# Patient Record
Sex: FEMALE | Race: BLACK OR AFRICAN AMERICAN | NOT HISPANIC OR LATINO | Employment: OTHER | URBAN - METROPOLITAN AREA
[De-identification: names, ages, dates, MRNs, and addresses within clinical notes are randomized per-mention and may not be internally consistent; named-entity substitution may affect disease eponyms.]

---

## 2023-05-23 LAB
ALANINE AMINOTRANSFERASE (SGPT) (U/L) IN SER/PLAS: 22 U/L (ref 7–45)
ALBUMIN (G/DL) IN SER/PLAS: 3.6 G/DL (ref 3.4–5)
ALKALINE PHOSPHATASE (U/L) IN SER/PLAS: 115 U/L (ref 33–136)
ANION GAP IN SER/PLAS: 13 MMOL/L (ref 10–20)
ASPARTATE AMINOTRANSFERASE (SGOT) (U/L) IN SER/PLAS: 17 U/L (ref 9–39)
BILIRUBIN TOTAL (MG/DL) IN SER/PLAS: 0.8 MG/DL (ref 0–1.2)
CALCIUM (MG/DL) IN SER/PLAS: 9.5 MG/DL (ref 8.6–10.6)
CARBON DIOXIDE, TOTAL (MMOL/L) IN SER/PLAS: 24 MMOL/L (ref 21–32)
CHLORIDE (MMOL/L) IN SER/PLAS: 109 MMOL/L (ref 98–107)
CHOLESTEROL (MG/DL) IN SER/PLAS: 142 MG/DL (ref 0–199)
CHOLESTEROL IN HDL (MG/DL) IN SER/PLAS: 45.6 MG/DL
CHOLESTEROL/HDL RATIO: 3.1
CREATININE (MG/DL) IN SER/PLAS: 1.17 MG/DL (ref 0.5–1.05)
ERYTHROCYTE DISTRIBUTION WIDTH (RATIO) BY AUTOMATED COUNT: 15.3 % (ref 11.5–14.5)
ERYTHROCYTE MEAN CORPUSCULAR HEMOGLOBIN CONCENTRATION (G/DL) BY AUTOMATED: 33 G/DL (ref 32–36)
ERYTHROCYTE MEAN CORPUSCULAR VOLUME (FL) BY AUTOMATED COUNT: 77 FL (ref 80–100)
ERYTHROCYTES (10*6/UL) IN BLOOD BY AUTOMATED COUNT: 4.6 X10E12/L (ref 4–5.2)
ESTIMATED AVERAGE GLUCOSE FOR HBA1C: 111 MG/DL
GFR FEMALE: 46 ML/MIN/1.73M2
GLUCOSE (MG/DL) IN SER/PLAS: 95 MG/DL (ref 74–99)
HEMATOCRIT (%) IN BLOOD BY AUTOMATED COUNT: 35.5 % (ref 36–46)
HEMOGLOBIN (G/DL) IN BLOOD: 11.7 G/DL (ref 12–16)
HEMOGLOBIN A1C/HEMOGLOBIN TOTAL IN BLOOD: 5.5 %
LDL: 86 MG/DL (ref 0–99)
LEUKOCYTES (10*3/UL) IN BLOOD BY AUTOMATED COUNT: 5.9 X10E9/L (ref 4.4–11.3)
NRBC (PER 100 WBCS) BY AUTOMATED COUNT: 0 /100 WBC (ref 0–0)
PLATELETS (10*3/UL) IN BLOOD AUTOMATED COUNT: 174 X10E9/L (ref 150–450)
POTASSIUM (MMOL/L) IN SER/PLAS: 4.2 MMOL/L (ref 3.5–5.3)
PROTEIN TOTAL: 6.7 G/DL (ref 6.4–8.2)
SODIUM (MMOL/L) IN SER/PLAS: 142 MMOL/L (ref 136–145)
TRIGLYCERIDE (MG/DL) IN SER/PLAS: 53 MG/DL (ref 0–149)
UREA NITROGEN (MG/DL) IN SER/PLAS: 22 MG/DL (ref 6–23)
VLDL: 11 MG/DL (ref 0–40)

## 2023-11-10 PROBLEM — H40.1221: Status: ACTIVE | Noted: 2023-11-10

## 2023-11-10 PROBLEM — B02.9 SHINGLES: Status: ACTIVE | Noted: 2023-11-10

## 2023-11-10 PROBLEM — R09.89 CAROTID BRUIT: Status: ACTIVE | Noted: 2023-11-10

## 2023-11-10 PROBLEM — I25.10 CAD (CORONARY ARTERY DISEASE): Status: ACTIVE | Noted: 2023-11-10

## 2023-11-10 PROBLEM — R63.4 UNEXPLAINED WEIGHT LOSS: Status: ACTIVE | Noted: 2023-11-10

## 2023-11-10 PROBLEM — E78.5 HYPERLIPIDEMIA: Status: ACTIVE | Noted: 2023-11-10

## 2023-11-10 PROBLEM — D64.9 ANEMIA: Status: ACTIVE | Noted: 2023-11-10

## 2023-11-10 PROBLEM — M54.12 CERVICAL RADICULOPATHY: Status: ACTIVE | Noted: 2023-11-10

## 2023-11-10 PROBLEM — E66.9 OBESITY: Status: ACTIVE | Noted: 2023-11-10

## 2023-11-10 PROBLEM — Z98.61 POST PTCA: Status: ACTIVE | Noted: 2023-11-10

## 2023-11-10 PROBLEM — H04.123 BILATERAL DRY EYES: Status: ACTIVE | Noted: 2023-11-10

## 2023-11-10 PROBLEM — H25.10 NUCLEAR SCLEROSIS: Status: ACTIVE | Noted: 2023-11-10

## 2023-11-10 PROBLEM — H52.7 REFRACTIVE ERROR: Status: ACTIVE | Noted: 2023-11-10

## 2023-11-10 PROBLEM — H40.1212: Status: ACTIVE | Noted: 2023-11-10

## 2023-11-10 PROBLEM — R01.1 HEART MURMUR: Status: ACTIVE | Noted: 2023-11-10

## 2023-11-10 PROBLEM — I35.0 AORTIC STENOSIS: Status: ACTIVE | Noted: 2023-11-10

## 2023-11-10 PROBLEM — B02.30 HERPES ZOSTER OPHTHALMICUS OF LEFT EYE: Status: ACTIVE | Noted: 2023-11-10

## 2023-11-10 PROBLEM — I10 HYPERTENSION: Status: ACTIVE | Noted: 2023-11-10

## 2023-11-10 PROBLEM — I21.4 NON-ST ELEVATION MYOCARDIAL INFARCTION (NSTEMI), SUBSEQUENT EPISODE OF CARE (MULTI): Status: ACTIVE | Noted: 2023-11-10

## 2023-11-10 PROBLEM — H52.4 BILATERAL PRESBYOPIA: Status: ACTIVE | Noted: 2023-11-10

## 2023-11-10 PROBLEM — R79.9 ABNORMAL BLOOD CHEMISTRY: Status: ACTIVE | Noted: 2023-11-10

## 2023-11-10 PROBLEM — G56.01 RIGHT CARPAL TUNNEL SYNDROME: Status: ACTIVE | Noted: 2023-11-10

## 2023-11-10 PROBLEM — J30.9 ALLERGIC RHINITIS: Status: ACTIVE | Noted: 2023-11-10

## 2023-11-10 PROBLEM — M79.643 PAIN IN HAND: Status: ACTIVE | Noted: 2023-11-10

## 2023-11-10 PROBLEM — M25.532 LEFT WRIST PAIN: Status: ACTIVE | Noted: 2023-11-10

## 2023-11-10 PROBLEM — R20.2 RIGHT HAND PARESTHESIA: Status: ACTIVE | Noted: 2023-11-10

## 2023-11-10 PROBLEM — G56.00 CARPAL TUNNEL SYNDROME: Status: ACTIVE | Noted: 2023-11-10

## 2023-11-10 PROBLEM — M11.232 PSEUDOGOUT OF LEFT WRIST: Status: ACTIVE | Noted: 2023-11-10

## 2023-11-10 PROBLEM — H52.03 HYPEROPIA OF BOTH EYES: Status: ACTIVE | Noted: 2023-11-10

## 2023-11-10 RX ORDER — EZETIMIBE 10 MG/1
1 TABLET ORAL NIGHTLY
COMMUNITY
Start: 2022-04-25 | End: 2024-02-26 | Stop reason: SDUPTHER

## 2023-11-10 RX ORDER — ATORVASTATIN CALCIUM 80 MG/1
1 TABLET, FILM COATED ORAL NIGHTLY
COMMUNITY
Start: 2021-11-23 | End: 2024-04-29

## 2023-11-10 RX ORDER — BRIMONIDINE TARTRATE 1.5 MG/ML
1 SOLUTION/ DROPS OPHTHALMIC 2 TIMES DAILY
COMMUNITY
Start: 2022-01-04

## 2023-11-10 RX ORDER — METOPROLOL TARTRATE 25 MG/1
0.5 TABLET, FILM COATED ORAL 2 TIMES DAILY
COMMUNITY
Start: 2021-11-23 | End: 2023-11-20 | Stop reason: SDUPTHER

## 2023-11-10 RX ORDER — NAPROXEN SODIUM 220 MG/1
1 TABLET, FILM COATED ORAL DAILY
COMMUNITY
Start: 2021-11-23

## 2023-11-10 RX ORDER — HYDROCHLOROTHIAZIDE 25 MG/1
1 TABLET ORAL DAILY
COMMUNITY
Start: 2014-05-27 | End: 2024-02-26 | Stop reason: SDUPTHER

## 2023-11-10 RX ORDER — CLOPIDOGREL BISULFATE 75 MG/1
1 TABLET ORAL DAILY
COMMUNITY
Start: 2013-12-10 | End: 2024-01-15 | Stop reason: SDUPTHER

## 2023-11-10 RX ORDER — ISOSORBIDE MONONITRATE 30 MG/1
1 TABLET, EXTENDED RELEASE ORAL DAILY
COMMUNITY
Start: 2021-11-23 | End: 2024-02-28 | Stop reason: ALTCHOICE

## 2023-11-20 DIAGNOSIS — I25.10 CORONARY ARTERY DISEASE INVOLVING NATIVE HEART, UNSPECIFIED VESSEL OR LESION TYPE, UNSPECIFIED WHETHER ANGINA PRESENT: Primary | ICD-10-CM

## 2023-11-20 RX ORDER — METOPROLOL TARTRATE 25 MG/1
12.5 TABLET, FILM COATED ORAL 2 TIMES DAILY
Qty: 90 TABLET | Refills: 1 | Status: SHIPPED | OUTPATIENT
Start: 2023-11-20 | End: 2024-05-27 | Stop reason: SDUPTHER

## 2023-11-27 ENCOUNTER — OFFICE VISIT (OUTPATIENT)
Dept: PRIMARY CARE | Facility: CLINIC | Age: 83
End: 2023-11-27
Payer: MEDICARE

## 2023-11-27 VITALS
HEART RATE: 60 BPM | RESPIRATION RATE: 16 BRPM | TEMPERATURE: 97.6 F | DIASTOLIC BLOOD PRESSURE: 74 MMHG | BODY MASS INDEX: 26.19 KG/M2 | OXYGEN SATURATION: 98 % | WEIGHT: 143.2 LBS | SYSTOLIC BLOOD PRESSURE: 126 MMHG

## 2023-11-27 DIAGNOSIS — E78.5 HYPERLIPIDEMIA, UNSPECIFIED HYPERLIPIDEMIA TYPE: ICD-10-CM

## 2023-11-27 DIAGNOSIS — I25.10 CORONARY ARTERY DISEASE, UNSPECIFIED VESSEL OR LESION TYPE, UNSPECIFIED WHETHER ANGINA PRESENT, UNSPECIFIED WHETHER NATIVE OR TRANSPLANTED HEART: Primary | ICD-10-CM

## 2023-11-27 DIAGNOSIS — I10 PRIMARY HYPERTENSION: ICD-10-CM

## 2023-11-27 DIAGNOSIS — Z12.31 ENCOUNTER FOR SCREENING MAMMOGRAM FOR MALIGNANT NEOPLASM OF BREAST: ICD-10-CM

## 2023-11-27 PROCEDURE — 99213 OFFICE O/P EST LOW 20 MIN: CPT | Mod: PO | Performed by: INTERNAL MEDICINE

## 2023-11-27 PROCEDURE — 1036F TOBACCO NON-USER: CPT | Performed by: INTERNAL MEDICINE

## 2023-11-27 PROCEDURE — 3074F SYST BP LT 130 MM HG: CPT | Performed by: INTERNAL MEDICINE

## 2023-11-27 PROCEDURE — 3078F DIAST BP <80 MM HG: CPT | Performed by: INTERNAL MEDICINE

## 2023-11-27 PROCEDURE — 1126F AMNT PAIN NOTED NONE PRSNT: CPT | Performed by: INTERNAL MEDICINE

## 2023-11-27 PROCEDURE — 99213 OFFICE O/P EST LOW 20 MIN: CPT | Performed by: INTERNAL MEDICINE

## 2023-11-27 PROCEDURE — 1159F MED LIST DOCD IN RCRD: CPT | Performed by: INTERNAL MEDICINE

## 2023-11-27 SDOH — ECONOMIC STABILITY: FOOD INSECURITY: WITHIN THE PAST 12 MONTHS, YOU WORRIED THAT YOUR FOOD WOULD RUN OUT BEFORE YOU GOT MONEY TO BUY MORE.: NEVER TRUE

## 2023-11-27 SDOH — ECONOMIC STABILITY: FOOD INSECURITY: WITHIN THE PAST 12 MONTHS, THE FOOD YOU BOUGHT JUST DIDN'T LAST AND YOU DIDN'T HAVE MONEY TO GET MORE.: NEVER TRUE

## 2023-11-27 ASSESSMENT — ENCOUNTER SYMPTOMS
CHILLS: 0
FEVER: 0
SHORTNESS OF BREATH: 0
ABDOMINAL PAIN: 0
NAUSEA: 0
VOMITING: 0

## 2023-11-27 ASSESSMENT — PAIN SCALES - GENERAL: PAINLEVEL: 0-NO PAIN

## 2023-11-27 NOTE — PROGRESS NOTES
Subjective   Patient ID: Zabrnia Syed is a 83 y.o. female who presents for No chief complaint on file..    Patient presents for follow up. No acute issues. Weight down 8 pounds since May. Under stress with her son who is in a SNF.          Review of Systems   Constitutional:  Negative for chills and fever.   Respiratory:  Negative for shortness of breath.    Cardiovascular:  Negative for chest pain.   Gastrointestinal:  Negative for abdominal pain, nausea and vomiting.       Objective   /74   Pulse 60   Temp 36.4 °C (97.6 °F)   Resp 16   Wt 65 kg (143 lb 3.2 oz)   SpO2 98%   BMI 26.19 kg/m²     Physical Exam  Gen appearance: well groomed in NAD  A & O: alert and oriented x 3  CV: RRR   Lungs: CTA bilaterally  Extr: no edema   Assessment/Plan   CAD: follow up with Cards  High chol: cont meds  3.   High blood pressure: cont meds  4.    Ordered mamm  5.    Return to office May for CPE.

## 2023-12-27 ENCOUNTER — APPOINTMENT (OUTPATIENT)
Dept: OPHTHALMOLOGY | Facility: CLINIC | Age: 83
End: 2023-12-27
Payer: MEDICARE

## 2024-01-15 DIAGNOSIS — I25.10 CORONARY ARTERY DISEASE, UNSPECIFIED VESSEL OR LESION TYPE, UNSPECIFIED WHETHER ANGINA PRESENT, UNSPECIFIED WHETHER NATIVE OR TRANSPLANTED HEART: Primary | ICD-10-CM

## 2024-01-15 RX ORDER — CLOPIDOGREL BISULFATE 75 MG/1
75 TABLET ORAL DAILY
Qty: 90 TABLET | Refills: 3 | Status: SHIPPED | OUTPATIENT
Start: 2024-01-15

## 2024-01-17 ENCOUNTER — ANCILLARY PROCEDURE (OUTPATIENT)
Dept: RADIOLOGY | Facility: CLINIC | Age: 84
End: 2024-01-17
Payer: MEDICARE

## 2024-01-17 ENCOUNTER — APPOINTMENT (OUTPATIENT)
Dept: RADIOLOGY | Facility: CLINIC | Age: 84
End: 2024-01-17
Payer: MEDICARE

## 2024-01-17 VITALS — HEIGHT: 62 IN | WEIGHT: 143 LBS | BODY MASS INDEX: 26.31 KG/M2

## 2024-01-17 DIAGNOSIS — Z12.31 ENCOUNTER FOR SCREENING MAMMOGRAM FOR MALIGNANT NEOPLASM OF BREAST: ICD-10-CM

## 2024-01-17 PROCEDURE — 77063 BREAST TOMOSYNTHESIS BI: CPT | Performed by: STUDENT IN AN ORGANIZED HEALTH CARE EDUCATION/TRAINING PROGRAM

## 2024-01-17 PROCEDURE — 77067 SCR MAMMO BI INCL CAD: CPT | Performed by: STUDENT IN AN ORGANIZED HEALTH CARE EDUCATION/TRAINING PROGRAM

## 2024-01-17 PROCEDURE — 77063 BREAST TOMOSYNTHESIS BI: CPT

## 2024-02-26 DIAGNOSIS — E78.5 HYPERLIPIDEMIA, UNSPECIFIED HYPERLIPIDEMIA TYPE: ICD-10-CM

## 2024-02-26 DIAGNOSIS — I25.10 CORONARY ARTERY DISEASE, UNSPECIFIED VESSEL OR LESION TYPE, UNSPECIFIED WHETHER ANGINA PRESENT, UNSPECIFIED WHETHER NATIVE OR TRANSPLANTED HEART: Primary | ICD-10-CM

## 2024-02-26 RX ORDER — HYDROCHLOROTHIAZIDE 25 MG/1
25 TABLET ORAL DAILY
Qty: 90 TABLET | Refills: 3 | Status: SHIPPED | OUTPATIENT
Start: 2024-02-26

## 2024-02-26 RX ORDER — EZETIMIBE 10 MG/1
10 TABLET ORAL NIGHTLY
Qty: 90 TABLET | Refills: 2 | Status: SHIPPED | OUTPATIENT
Start: 2024-02-26

## 2024-02-28 ENCOUNTER — HOSPITAL ENCOUNTER (OUTPATIENT)
Dept: CARDIOLOGY | Facility: HOSPITAL | Age: 84
Discharge: HOME | End: 2024-02-28
Payer: MEDICARE

## 2024-02-28 ENCOUNTER — OFFICE VISIT (OUTPATIENT)
Dept: CARDIOLOGY | Facility: HOSPITAL | Age: 84
End: 2024-02-28
Payer: MEDICARE

## 2024-02-28 VITALS
WEIGHT: 132.1 LBS | HEART RATE: 69 BPM | DIASTOLIC BLOOD PRESSURE: 70 MMHG | BODY MASS INDEX: 24.31 KG/M2 | HEIGHT: 62 IN | SYSTOLIC BLOOD PRESSURE: 125 MMHG

## 2024-02-28 DIAGNOSIS — I35.0 NONRHEUMATIC AORTIC (VALVE) STENOSIS: ICD-10-CM

## 2024-02-28 DIAGNOSIS — I35.2 NONRHEUMATIC AORTIC (VALVE) STENOSIS WITH INSUFFICIENCY: ICD-10-CM

## 2024-02-28 DIAGNOSIS — I25.10 CORONARY ARTERY DISEASE, UNSPECIFIED VESSEL OR LESION TYPE, UNSPECIFIED WHETHER ANGINA PRESENT, UNSPECIFIED WHETHER NATIVE OR TRANSPLANTED HEART: Primary | ICD-10-CM

## 2024-02-28 LAB
AORTIC VALVE MEAN GRADIENT: 23 MMHG
AORTIC VALVE PEAK VELOCITY: 3.32 M/S
ATRIAL RATE: 69 BPM
AV PEAK GRADIENT: 44.1 MMHG
AVA (PEAK VEL): 0.83 CM2
AVA (VTI): 0.91 CM2
EJECTION FRACTION APICAL 4 CHAMBER: 34.3
EJECTION FRACTION: 41 %
LEFT ATRIUM VOLUME AREA LENGTH INDEX BSA: 58.7 ML/M2
LEFT VENTRICLE INTERNAL DIMENSION DIASTOLE: 4.3 CM (ref 3.5–6)
LEFT VENTRICULAR OUTFLOW TRACT DIAMETER: 2 CM
MITRAL VALVE E/A RATIO: 1.75
MITRAL VALVE E/E' RATIO: 18.5
P AXIS: 67 DEGREES
P OFFSET: 164 MS
P ONSET: 132 MS
PR INTERVAL: 168 MS
Q ONSET: 216 MS
QRS COUNT: 12 BEATS
QRS DURATION: 122 MS
QT INTERVAL: 468 MS
QTC CALCULATION(BAZETT): 501 MS
QTC FREDERICIA: 490 MS
R AXIS: -51 DEGREES
RIGHT VENTRICLE FREE WALL PEAK S': 7.28 CM/S
RIGHT VENTRICLE PEAK SYSTOLIC PRESSURE: 35.9 MMHG
T AXIS: 103 DEGREES
T OFFSET: 450 MS
TRICUSPID ANNULAR PLANE SYSTOLIC EXCURSION: 1.7 CM
VENTRICULAR RATE: 69 BPM

## 2024-02-28 PROCEDURE — 99214 OFFICE O/P EST MOD 30 MIN: CPT | Performed by: INTERNAL MEDICINE

## 2024-02-28 PROCEDURE — 1036F TOBACCO NON-USER: CPT | Performed by: INTERNAL MEDICINE

## 2024-02-28 PROCEDURE — 93306 TTE W/DOPPLER COMPLETE: CPT

## 2024-02-28 PROCEDURE — 3078F DIAST BP <80 MM HG: CPT | Performed by: INTERNAL MEDICINE

## 2024-02-28 PROCEDURE — 93306 TTE W/DOPPLER COMPLETE: CPT | Performed by: STUDENT IN AN ORGANIZED HEALTH CARE EDUCATION/TRAINING PROGRAM

## 2024-02-28 PROCEDURE — 93010 ELECTROCARDIOGRAM REPORT: CPT | Performed by: INTERNAL MEDICINE

## 2024-02-28 PROCEDURE — 99214 OFFICE O/P EST MOD 30 MIN: CPT | Mod: 25 | Performed by: INTERNAL MEDICINE

## 2024-02-28 PROCEDURE — 1159F MED LIST DOCD IN RCRD: CPT | Performed by: INTERNAL MEDICINE

## 2024-02-28 PROCEDURE — 3074F SYST BP LT 130 MM HG: CPT | Performed by: INTERNAL MEDICINE

## 2024-02-28 PROCEDURE — 1160F RVW MEDS BY RX/DR IN RCRD: CPT | Performed by: INTERNAL MEDICINE

## 2024-02-28 PROCEDURE — 1126F AMNT PAIN NOTED NONE PRSNT: CPT | Performed by: INTERNAL MEDICINE

## 2024-02-28 PROCEDURE — 93005 ELECTROCARDIOGRAM TRACING: CPT | Performed by: INTERNAL MEDICINE

## 2024-02-28 NOTE — PROGRESS NOTES
Subjective:  Patient returns for a routine 6-month follow-up.  She is a pleasant 83-year-old female we follow for hypertension and hyperlipidemia.  She also has mixed aortic valve disease as well as known coronary disease.  She did undergo LCx stenting in the setting of an NSTEMI back in 2021.  She generally has been doing well clinically.  She did have a repeat echo today to reassess her aortic valve disease.  She denies any chest pain or dyspnea at a reasonable activity level.  She denies any palpitations or syncope.  She denies any stroke or TIA symptomatology.  She unfortunately lost a bit of weight, but this is in part due to a diminished appetite as she is caring for her son who did have a stroke.  She denies any other new health concerns.  She has not had any hospitalizations and denies any other new health concerns.    Objective:  General: Pleasant, slender elderly female in no distress.  HEENT: Unchanged.  Lungs: Clear without crackles.  Cardiac: Normal S1 and S2 with unchanged 3/6 systolic murmur.  Abdomen: Nontender.  Extremities: No edema.  Skin: No rash.  Neuro: Grossly intact.    EKG: Sinus rhythm with PACs.  LVH.  No acute ST or T wave changes.    Lipid panel: Cholesterol-142, HDL-46, LDL-86, TG-53.    Impression/plan:  Zabrina generally remains clinically stable at this time.  Her heart rate and blood pressure remain under very good control on her current medical regimen.  She remains on appropriate antiplatelet therapy given her LCx stent.  She is not having any symptoms to suggest progressive coronary disease.  I did not think we needed embark on any repeat testing at this time.  I will review her echo results with her when they are available to see where her aortic valve disease stands.  I will see her back in 6 months unless her echo shows compelling aortic valve disease in which case I will sort out if she needs a repeat catheterization.    Patient instructions:    Continue current medications  unchanged.    Return to clinic in 6 months.    Call to review your echo results.

## 2024-03-04 ENCOUNTER — OFFICE VISIT (OUTPATIENT)
Dept: PRIMARY CARE | Facility: CLINIC | Age: 84
End: 2024-03-04
Payer: MEDICARE

## 2024-03-04 VITALS
DIASTOLIC BLOOD PRESSURE: 63 MMHG | HEIGHT: 60 IN | BODY MASS INDEX: 26.62 KG/M2 | HEART RATE: 72 BPM | RESPIRATION RATE: 16 BRPM | WEIGHT: 135.6 LBS | SYSTOLIC BLOOD PRESSURE: 122 MMHG | TEMPERATURE: 97.9 F | OXYGEN SATURATION: 99 %

## 2024-03-04 DIAGNOSIS — R63.4 UNEXPLAINED WEIGHT LOSS: Primary | ICD-10-CM

## 2024-03-04 PROCEDURE — 1160F RVW MEDS BY RX/DR IN RCRD: CPT | Performed by: INTERNAL MEDICINE

## 2024-03-04 PROCEDURE — 1126F AMNT PAIN NOTED NONE PRSNT: CPT | Performed by: INTERNAL MEDICINE

## 2024-03-04 PROCEDURE — 99213 OFFICE O/P EST LOW 20 MIN: CPT | Performed by: INTERNAL MEDICINE

## 2024-03-04 PROCEDURE — 1159F MED LIST DOCD IN RCRD: CPT | Performed by: INTERNAL MEDICINE

## 2024-03-04 PROCEDURE — 1036F TOBACCO NON-USER: CPT | Performed by: INTERNAL MEDICINE

## 2024-03-04 PROCEDURE — 3078F DIAST BP <80 MM HG: CPT | Performed by: INTERNAL MEDICINE

## 2024-03-04 PROCEDURE — 3074F SYST BP LT 130 MM HG: CPT | Performed by: INTERNAL MEDICINE

## 2024-03-04 SDOH — ECONOMIC STABILITY: FOOD INSECURITY: WITHIN THE PAST 12 MONTHS, THE FOOD YOU BOUGHT JUST DIDN'T LAST AND YOU DIDN'T HAVE MONEY TO GET MORE.: NEVER TRUE

## 2024-03-04 SDOH — ECONOMIC STABILITY: FOOD INSECURITY: WITHIN THE PAST 12 MONTHS, YOU WORRIED THAT YOUR FOOD WOULD RUN OUT BEFORE YOU GOT MONEY TO BUY MORE.: NEVER TRUE

## 2024-03-04 ASSESSMENT — ENCOUNTER SYMPTOMS
OCCASIONAL FEELINGS OF UNSTEADINESS: 0
NAUSEA: 0
LOSS OF SENSATION IN FEET: 0
ABDOMINAL PAIN: 0
VOMITING: 0
CHILLS: 0
SHORTNESS OF BREATH: 0
FEVER: 0
DEPRESSION: 0

## 2024-03-04 ASSESSMENT — LIFESTYLE VARIABLES
HOW OFTEN DO YOU HAVE SIX OR MORE DRINKS ON ONE OCCASION: NEVER
SKIP TO QUESTIONS 9-10: 1
AUDIT-C TOTAL SCORE: 0
HOW MANY STANDARD DRINKS CONTAINING ALCOHOL DO YOU HAVE ON A TYPICAL DAY: PATIENT DOES NOT DRINK
HOW OFTEN DO YOU HAVE A DRINK CONTAINING ALCOHOL: NEVER

## 2024-03-04 ASSESSMENT — PAIN SCALES - GENERAL: PAINLEVEL: 0-NO PAIN

## 2024-03-04 NOTE — PROGRESS NOTES
Subjective   Patient ID: Zabrina Syed is a 83 y.o. female who presents for Follow-up.    Presents for weight check. Saw Cards. No change in meds. Weight is up a couple of pounds.          Review of Systems   Constitutional:  Negative for chills and fever.   Respiratory:  Negative for shortness of breath.    Cardiovascular:  Negative for chest pain.   Gastrointestinal:  Negative for abdominal pain, nausea and vomiting.       Objective   /63 (BP Location: Left arm, Patient Position: Sitting, BP Cuff Size: Adult)   Pulse 72   Temp 36.6 °C (97.9 °F) (Temporal)   Resp 16   Ht 1.524 m (5')   Wt 61.5 kg (135 lb 9.6 oz)   SpO2 99%   BMI 26.48 kg/m²     Physical Exam  Gen appearance: well groomed in NAD  A & O: alert and oriented x 3  CV: RRR   Lungs: CTA bilaterally  Extr: no edema   Assessment/Plan   Weight loss: stable. Up a few pounds. Cont to monitor'  Keep May CPE appt.

## 2024-03-15 ENCOUNTER — APPOINTMENT (OUTPATIENT)
Dept: OPHTHALMOLOGY | Facility: CLINIC | Age: 84
End: 2024-03-15
Payer: MEDICARE

## 2024-03-21 ENCOUNTER — APPOINTMENT (OUTPATIENT)
Dept: OPHTHALMOLOGY | Facility: CLINIC | Age: 84
End: 2024-03-21
Payer: MEDICARE

## 2024-03-22 ENCOUNTER — APPOINTMENT (OUTPATIENT)
Dept: PRIMARY CARE | Facility: CLINIC | Age: 84
End: 2024-03-22
Payer: MEDICARE

## 2024-04-16 ENCOUNTER — TELEPHONE (OUTPATIENT)
Dept: CARDIOLOGY | Facility: CLINIC | Age: 84
End: 2024-04-16
Payer: MEDICARE

## 2024-04-16 DIAGNOSIS — E78.5 HYPERLIPIDEMIA: ICD-10-CM

## 2024-04-16 DIAGNOSIS — I35.0 AORTIC STENOSIS: Primary | ICD-10-CM

## 2024-04-16 DIAGNOSIS — I10 HYPERTENSION: ICD-10-CM

## 2024-04-16 DIAGNOSIS — I25.10 CAD (CORONARY ARTERY DISEASE): ICD-10-CM

## 2024-04-22 ENCOUNTER — LAB (OUTPATIENT)
Dept: LAB | Facility: LAB | Age: 84
End: 2024-04-22
Payer: MEDICARE

## 2024-04-22 DIAGNOSIS — I25.10 CAD (CORONARY ARTERY DISEASE): ICD-10-CM

## 2024-04-22 DIAGNOSIS — I35.0 AORTIC STENOSIS: ICD-10-CM

## 2024-04-22 DIAGNOSIS — I10 HYPERTENSION: ICD-10-CM

## 2024-04-22 DIAGNOSIS — E78.5 HYPERLIPIDEMIA: ICD-10-CM

## 2024-04-22 LAB
ANION GAP SERPL CALC-SCNC: 15 MMOL/L (ref 10–20)
BUN SERPL-MCNC: 26 MG/DL (ref 6–23)
CALCIUM SERPL-MCNC: 9.5 MG/DL (ref 8.6–10.3)
CHLORIDE SERPL-SCNC: 102 MMOL/L (ref 98–107)
CO2 SERPL-SCNC: 25 MMOL/L (ref 21–32)
CREAT SERPL-MCNC: 1 MG/DL (ref 0.5–1.05)
EGFRCR SERPLBLD CKD-EPI 2021: 56 ML/MIN/1.73M*2
ERYTHROCYTE [DISTWIDTH] IN BLOOD BY AUTOMATED COUNT: 15.7 % (ref 11.5–14.5)
GLUCOSE SERPL-MCNC: 100 MG/DL (ref 74–99)
HCT VFR BLD AUTO: 37.3 % (ref 36–46)
HGB BLD-MCNC: 12.4 G/DL (ref 12–16)
INR PPP: 1.1 (ref 0.9–1.1)
MCH RBC QN AUTO: 25.7 PG (ref 26–34)
MCHC RBC AUTO-ENTMCNC: 33.2 G/DL (ref 32–36)
MCV RBC AUTO: 77 FL (ref 80–100)
NRBC BLD-RTO: 0 /100 WBCS (ref 0–0)
PLATELET # BLD AUTO: 220 X10*3/UL (ref 150–450)
POTASSIUM SERPL-SCNC: 3.7 MMOL/L (ref 3.5–5.3)
PROTHROMBIN TIME: 12.9 SECONDS (ref 9.8–12.8)
RBC # BLD AUTO: 4.82 X10*6/UL (ref 4–5.2)
SODIUM SERPL-SCNC: 138 MMOL/L (ref 136–145)
WBC # BLD AUTO: 6.7 X10*3/UL (ref 4.4–11.3)

## 2024-04-22 PROCEDURE — 85027 COMPLETE CBC AUTOMATED: CPT

## 2024-04-22 PROCEDURE — 85610 PROTHROMBIN TIME: CPT

## 2024-04-22 PROCEDURE — 36415 COLL VENOUS BLD VENIPUNCTURE: CPT

## 2024-04-22 PROCEDURE — 80048 BASIC METABOLIC PNL TOTAL CA: CPT

## 2024-04-22 NOTE — H&P
History Of Present Illness  Zabrina Syed is a 83 y.o. female presenting with moderate to severe aortic valve stenosis. PMH includes CAD s/p LCx PCI in 2021, HTN, HLD     Past Medical History  She has a past medical history of Low-tension glaucoma, unspecified eye, stage unspecified (12/01/2015), Personal history of other diseases of the circulatory system (01/21/2020), and Personal history of other medical treatment.    Surgical History  She has a past surgical history that includes Carpal tunnel release (01/13/2016); Appendectomy (12/02/2014); and Shoulder surgery (12/02/2014).     Social History  She reports that she has never smoked. She has never used smokeless tobacco. She reports that she does not drink alcohol and does not use drugs.    Family History  No family history on file.     Allergies  Penicillin    Home Medications  No current facility-administered medications on file prior to encounter.     Current Outpatient Medications on File Prior to Encounter   Medication Sig Dispense Refill    aspirin 81 mg chewable tablet Chew 1 tablet (81 mg) once daily.      atorvastatin (Lipitor) 80 mg tablet Take 1 tablet (80 mg) by mouth once daily at bedtime.      brimonidine (AlphaGAN P) 0.15 % ophthalmic solution Administer 1 drop into affected eye(s) 2 times a day.      clopidogrel (Plavix) 75 mg tablet Take 1 tablet (75 mg) by mouth once daily. 90 tablet 3    ezetimibe (Zetia) 10 mg tablet Take 1 tablet (10 mg) by mouth once daily at bedtime. 90 tablet 2    hydroCHLOROthiazide (HYDRODiuril) 25 mg tablet Take 1 tablet (25 mg) by mouth once daily. 90 tablet 3    metoprolol tartrate (Lopressor) 25 mg tablet Take 0.5 tablets (12.5 mg) by mouth 2 times a day. 90 tablet 1    walker misc Use as Directed.            Inpatient Medications:            Review of Systems   Constitutional: Negative.    HENT: Negative.     Eyes: Negative.    Respiratory: Negative.     Cardiovascular: Negative.    Gastrointestinal: Negative.     Endocrine: Negative.    Genitourinary: Negative.    Musculoskeletal: Negative.    Skin: Negative.    Allergic/Immunologic: Negative.    Neurological: Negative.    Hematological: Negative.    Psychiatric/Behavioral: Negative.            Physical Exam  Constitutional:       General: She is awake. She is not in acute distress.     Appearance: She is not ill-appearing.   Cardiovascular:      Rate and Rhythm: Normal rate and regular rhythm.      Pulses: Normal pulses.           Radial pulses are 2+ on the right side and 2+ on the left side.        Dorsalis pedis pulses are 2+ on the right side and 2+ on the left side.      Heart sounds: Murmur heard.      Systolic murmur is present with a grade of 3/6.   Pulmonary:      Effort: Pulmonary effort is normal.      Breath sounds: Normal breath sounds and air entry.   Abdominal:      General: Bowel sounds are normal.      Palpations: Abdomen is soft.      Tenderness: There is no abdominal tenderness.   Musculoskeletal:      Right lower leg: No edema.      Left lower leg: No edema.   Skin:     General: Skin is warm and dry.   Neurological:      General: No focal deficit present.      Mental Status: She is alert and oriented to person, place, and time.      GCS: GCS eye subscore is 4. GCS verbal subscore is 5. GCS motor subscore is 6.   Psychiatric:         Mood and Affect: Mood normal.         Behavior: Behavior is cooperative.        Sedation Plan    ASA 3     Mallampati class: II.         Last Recorded Vitals  There were no vitals taken for this visit.         Vitals from the Past 24 Hours            Relevant Results    Labs    CBC:   Recent Labs     04/22/24  1611 05/22/23  0000 05/13/22  1620 11/22/21  0601 11/21/21  0710 11/19/21  0836   WBC 6.7 5.9 4.4 7.1 6.6 6.0   HGB 12.4 11.7* 13.0 11.7* 12.1 11.5*   HCT 37.3 35.5* 38.7 34.6* 35.7* 34.7*    174 165 270 266 273   MCV 77* 77* 77* 76* 76* 76*     BMP/CMP:   Recent Labs     04/22/24  1611 05/22/23  0000  "05/13/22  1620 04/20/22  1117 11/22/21  0601 11/21/21  0710 11/20/21  0711 11/19/21  0836 11/18/21  0539 11/17/21  1025 03/15/21  1025 01/21/20  1512 02/18/19  1400 01/11/19  1310    142 138  --  135* 137 139   < > 140   < > 143 143   < > 142   K 3.7 4.2 4.0  --  3.7 3.8 4.1   < > 3.9   < > 4.3 4.5   < > 4.3    109* 101  --  100 101 103   < > 102   < > 108* 106   < > 104   BUN 26* 22 24*  --  25* 22 21   < > 22   < > 20 17   < > 35*   CREATININE 1.00 1.17* 1.49*  --  1.13* 1.08* 1.09*   < > 1.10*   < > 0.82 0.93   < > 1.25*   CO2 25 24 24  --  26 26 25   < > 28   < > 28 26   < > 24   CALCIUM 9.5 9.5 9.1  --  9.1 9.0 9.1   < > 9.4   < > 9.2 9.9   < > 9.9   PROT  --  6.7 7.3  --   --   --   --   --  7.3  --  6.7 7.1  --  7.5   BILITOT  --  0.8 0.4  --   --   --   --   --  0.4  --  0.4 0.4  --  0.3   ALKPHOS  --  115 95  --   --   --   --   --  101  --  120 128  --  116   ALT  --  22 15 15  --   --   --   --  10  --  8 8  --  9   AST  --  17 29 20  --   --   --   --  17  --  17 13  --  16   GLUCOSE 100* 95 96  --  100* 103* 87   < > 92   < > 99 97   < > 99    < > = values in this interval not displayed.      Lipid Panel:   Recent Labs     05/22/23  0000 04/20/22  1117 11/18/21  0539 03/15/21  1025 01/21/20  1512 01/11/19  1310 12/22/17  1150   CHOL 142 176 165 188 205* 194 175   HDL 45.6 43.1 37.3* 44.9 54.7 41.4 47.1   CHHDL 3.1 4.1 4.4 4.2 3.7 4.7 3.7   VLDL 11 12 13 11 16 15 11   TRIG 53 60 65 54 82 77 55     Cardiac       No lab exists for component: \"CK\", \"CKMBP\"   Hemoglobin A1C:   Recent Labs     05/22/23  0000 03/15/21  1025 01/11/19  1310   HGBA1C 5.5 5.5 5.9     TSH/ Free T4:   Recent Labs     01/11/19  1310   TSH 1.48     Iron:   Recent Labs     11/20/21  0711 11/17/21  1025   * 211*     Coag:   Results from last 7 days   Lab Units 04/22/24  1611   INR  1.1     ABO: No results found for: \"ABO\"    Past Cardiology Tests (Last 3 Years):  EKG:  Encounter Date: 02/28/24   ECG 12 lead (Clinic " Performed)   Result Value    Ventricular Rate 69    Atrial Rate 69    IA Interval 168    QRS Duration 122    QT Interval 468    QTC Calculation(Bazett) 501    P Axis 67    R Axis -51    T Axis 103    QRS Count 12    Q Onset 216    P Onset 132    P Offset 164    T Offset 450    QTC Fredericia 490    Narrative    Sinus rhythm with Premature atrial complexes  Left axis deviation  Left ventricular hypertrophy with QRS widening and repolarization abnormality  Cannot rule out Septal infarct , age undetermined  Abnormal ECG  Confirmed by Alex Onofre ([id number]) on 2/28/2024 2:39:48 PM     Echo:  Results for orders placed during the hospital encounter of 02/28/24    Transthoracic Echo (TTE) Complete    Study Date:        2/28/2024              PHYSICIAN INTERPRETATION:  Left Ventricle: The left ventricular systolic function is mildly decreased, with an estimated ejection fraction of 40-45%. Estimated left ventricular mass is borderline increased. There is global hypokinesis of the left ventricle with minor regional variations. The left ventricular cavity size is normal. The left ventricular septal wall thickness is mildly increased. There is normal left ventricular posterior wall thickness. Spectral Doppler shows a pseudonormal pattern of left ventricular diastolic filling.  Left Atrium: The left atrium is severely dilated.  Right Ventricle: The right ventricle is normal in size. There is low normal right ventricular systolic function. TAPSE 17mm.  Right Atrium: The right atrium is normal in size.  Aortic Valve: The aortic valve appears abnormal. The aortic valve appears tricuspid with restriction. There is severe aortic valve cusp calcification. There is evidence of moderate to severe aortic valve stenosis.  The aortic valve dimensionless index is 0.29. There is moderate aortic valve regurgitation. The peak instantaneous gradient of the aortic valve is 44.1 mmHg. The mean gradient of the aortic valve is 23.0 mmHg.  Heavily calcified with fusion of the left and non-coronary cusps.  Mitral Valve: The mitral valve is moderately thickened. There is mild to moderate mitral annular calcification. There is mild mitral valve regurgitation which is centrally directed.  Tricuspid Valve: The tricuspid valve is structurally normal. There is mild to moderate tricuspid regurgitation.  Pulmonic Valve: The pulmonic valve is structurally normal. There is mild pulmonic valve regurgitation.  Pericardium: There is no pericardial effusion noted.  Aorta: The aortic root is normal.  Pulmonary Artery: The tricuspid regurgitant velocity is 2.87 m/s, and with an estimated right atrial pressure of 3 mmHg, the estimated pulmonary artery pressure is mildly elevated with the RVSP at 35.9 mmHg.  Systemic Veins: The inferior vena cava appears mildly dilated. There is IVC inspiratory collapse greater than 50%.  In comparison to the previous echocardiogram(s): Compared with study from 2023, there is progression of aortic stenosis and reduction of LVEF from 55-60% down to 40-45%.      CONCLUSIONS:  1. Left ventricular systolic function is mildly decreased with a 40-45% estimated ejection fraction.  2. Spectral Doppler shows a pseudonormal pattern of left ventricular diastolic filling.  3. There is global hypokinesis of the left ventricle with minor regional variations.  4. Borderline increased LV mass.  5. There is low normal right ventricular systolic function.  6. The left atrium is severely dilated.  7. Moderate to severe bordering on severe aortic valve stenosis. AoV Vmax: 3.32 m/s AoV Mean P.0 mmHg AoV Area, VTI: 0.91 cm2 AoV Dimensionless Index: 0.29    8. The aortic valve appears tricuspid with restriction.  9. There is severe aortic valve cusp calcification.  10. Moderate aortic valve regurgitation.  11. The mitral valve is moderately thickened.  12. Mild to moderate tricuspid regurgitation visualized.  13. Compared with study from 2023,  there is progression of aortic stenosis and reduction of LVEF from 55-60% down to 40-45%.      Ejection Fractions:  LV EF   Date/Time Value Ref Range Status   02/28/2024 10:50 AM 41 %      Cath:  No results found for this or any previous visit.    Stress Test:  No results found for this or any previous visit.    Cardiac Imaging:  No results found for this or any previous visit.       Assessment/Plan  Assessment/Plan   Active Problems:  There are no active Hospital Problems.        moderate to severe aortic valve stenosis  -R/LHC with Dr. Onofre on 4/23/24       I spent 30 minutes in the professional and overall care of this patient.      Omid Forrest, APRN-CNP, DNP

## 2024-04-23 ENCOUNTER — HOSPITAL ENCOUNTER (OUTPATIENT)
Facility: HOSPITAL | Age: 84
Setting detail: OUTPATIENT SURGERY
Discharge: HOME | End: 2024-04-23
Attending: INTERNAL MEDICINE | Admitting: INTERNAL MEDICINE
Payer: MEDICARE

## 2024-04-23 VITALS
SYSTOLIC BLOOD PRESSURE: 117 MMHG | RESPIRATION RATE: 20 BRPM | WEIGHT: 135 LBS | TEMPERATURE: 97.2 F | OXYGEN SATURATION: 98 % | HEART RATE: 65 BPM | BODY MASS INDEX: 26.5 KG/M2 | DIASTOLIC BLOOD PRESSURE: 50 MMHG | HEIGHT: 60 IN

## 2024-04-23 DIAGNOSIS — I35.2 NONRHEUMATIC AORTIC (VALVE) STENOSIS WITH INSUFFICIENCY: ICD-10-CM

## 2024-04-23 DIAGNOSIS — I25.10 ATHEROSCLEROTIC HEART DISEASE OF NATIVE CORONARY ARTERY WITHOUT ANGINA PECTORIS: ICD-10-CM

## 2024-04-23 DIAGNOSIS — I35.0 NONRHEUMATIC AORTIC (VALVE) STENOSIS: Primary | ICD-10-CM

## 2024-04-23 PROCEDURE — 99153 MOD SED SAME PHYS/QHP EA: CPT | Performed by: INTERNAL MEDICINE

## 2024-04-23 PROCEDURE — 2500000004 HC RX 250 GENERAL PHARMACY W/ HCPCS (ALT 636 FOR OP/ED): Performed by: NURSE PRACTITIONER

## 2024-04-23 PROCEDURE — 99152 MOD SED SAME PHYS/QHP 5/>YRS: CPT | Performed by: INTERNAL MEDICINE

## 2024-04-23 PROCEDURE — 2500000004 HC RX 250 GENERAL PHARMACY W/ HCPCS (ALT 636 FOR OP/ED): Performed by: INTERNAL MEDICINE

## 2024-04-23 PROCEDURE — 7100000009 HC PHASE TWO TIME - INITIAL BASE CHARGE: Performed by: INTERNAL MEDICINE

## 2024-04-23 PROCEDURE — 2500000005 HC RX 250 GENERAL PHARMACY W/O HCPCS: Performed by: INTERNAL MEDICINE

## 2024-04-23 PROCEDURE — 7100000001 HC RECOVERY ROOM TIME - INITIAL BASE CHARGE: Performed by: INTERNAL MEDICINE

## 2024-04-23 PROCEDURE — 7100000002 HC RECOVERY ROOM TIME - EACH INCREMENTAL 1 MINUTE: Performed by: INTERNAL MEDICINE

## 2024-04-23 PROCEDURE — C1751 CATH, INF, PER/CENT/MIDLINE: HCPCS | Performed by: INTERNAL MEDICINE

## 2024-04-23 PROCEDURE — 93460 R&L HRT ART/VENTRICLE ANGIO: CPT | Performed by: INTERNAL MEDICINE

## 2024-04-23 PROCEDURE — 7100000010 HC PHASE TWO TIME - EACH INCREMENTAL 1 MINUTE: Performed by: INTERNAL MEDICINE

## 2024-04-23 PROCEDURE — C1894 INTRO/SHEATH, NON-LASER: HCPCS | Performed by: INTERNAL MEDICINE

## 2024-04-23 PROCEDURE — 93567 NJX CAR CTH SPRVLV AORTGRPHY: CPT | Performed by: INTERNAL MEDICINE

## 2024-04-23 PROCEDURE — 2720000007 HC OR 272 NO HCPCS: Performed by: INTERNAL MEDICINE

## 2024-04-23 PROCEDURE — 99222 1ST HOSP IP/OBS MODERATE 55: CPT | Performed by: NURSE PRACTITIONER

## 2024-04-23 PROCEDURE — C1887 CATHETER, GUIDING: HCPCS | Performed by: INTERNAL MEDICINE

## 2024-04-23 RX ORDER — SODIUM CHLORIDE 9 MG/ML
1.5 INJECTION, SOLUTION INTRAVENOUS CONTINUOUS
Status: ACTIVE | OUTPATIENT
Start: 2024-04-23 | End: 2024-04-23

## 2024-04-23 RX ORDER — NAPROXEN SODIUM 220 MG/1
81 TABLET, FILM COATED ORAL ONCE
Status: DISCONTINUED | OUTPATIENT
Start: 2024-04-23 | End: 2024-04-23

## 2024-04-23 RX ORDER — ACETAMINOPHEN 325 MG/1
650 TABLET ORAL EVERY 6 HOURS PRN
Status: DISCONTINUED | OUTPATIENT
Start: 2024-04-23 | End: 2024-04-23 | Stop reason: HOSPADM

## 2024-04-23 RX ORDER — MIDAZOLAM HYDROCHLORIDE 1 MG/ML
INJECTION, SOLUTION INTRAMUSCULAR; INTRAVENOUS AS NEEDED
Status: DISCONTINUED | OUTPATIENT
Start: 2024-04-23 | End: 2024-04-23 | Stop reason: HOSPADM

## 2024-04-23 RX ORDER — LIDOCAINE HYDROCHLORIDE 10 MG/ML
INJECTION, SOLUTION EPIDURAL; INFILTRATION; INTRACAUDAL; PERINEURAL AS NEEDED
Status: DISCONTINUED | OUTPATIENT
Start: 2024-04-23 | End: 2024-04-23 | Stop reason: HOSPADM

## 2024-04-23 RX ORDER — SODIUM CHLORIDE 9 MG/ML
75 INJECTION, SOLUTION INTRAVENOUS CONTINUOUS
Status: DISCONTINUED | OUTPATIENT
Start: 2024-04-23 | End: 2024-04-23

## 2024-04-23 RX ADMIN — SODIUM CHLORIDE 1.5 ML/KG/HR: 9 INJECTION, SOLUTION INTRAVENOUS at 13:15

## 2024-04-23 ASSESSMENT — ENCOUNTER SYMPTOMS
NEUROLOGICAL NEGATIVE: 1
MUSCULOSKELETAL NEGATIVE: 1
PSYCHIATRIC NEGATIVE: 1
EYES NEGATIVE: 1
CONSTITUTIONAL NEGATIVE: 1
ENDOCRINE NEGATIVE: 1
ALLERGIC/IMMUNOLOGIC NEGATIVE: 1
GASTROINTESTINAL NEGATIVE: 1
RESPIRATORY NEGATIVE: 1
CARDIOVASCULAR NEGATIVE: 1
HEMATOLOGIC/LYMPHATIC NEGATIVE: 1

## 2024-04-23 ASSESSMENT — PAIN SCALES - GENERAL
PAINLEVEL_OUTOF10: 0 - NO PAIN

## 2024-04-23 ASSESSMENT — COLUMBIA-SUICIDE SEVERITY RATING SCALE - C-SSRS
2. HAVE YOU ACTUALLY HAD ANY THOUGHTS OF KILLING YOURSELF?: NO
1. IN THE PAST MONTH, HAVE YOU WISHED YOU WERE DEAD OR WISHED YOU COULD GO TO SLEEP AND NOT WAKE UP?: NO
6. HAVE YOU EVER DONE ANYTHING, STARTED TO DO ANYTHING, OR PREPARED TO DO ANYTHING TO END YOUR LIFE?: NO

## 2024-04-23 ASSESSMENT — PAIN - FUNCTIONAL ASSESSMENT
PAIN_FUNCTIONAL_ASSESSMENT: 0-10
PAIN_FUNCTIONAL_ASSESSMENT: 0-10

## 2024-04-23 NOTE — NURSING NOTE
Patient taken to PACU in stable condition. Discussed Hemostasis time (for Venous and Arterial Stick Sites), Amount of Bedrest, Dopplerable PT Pulses, Patient's vitals and fluid orders. Gave the RN the NP and Mds names for any complications and the patient confirmed her son will pick her up. Nurses From PACU stated there were no further questions.

## 2024-04-23 NOTE — POST-PROCEDURE NOTE
Physician Transition of Care Summary  Invasive Cardiovascular Lab    Procedure Date: 4/23/2024  Attending:    * Alex Onofre - Primary  Resident/Fellow/Other Assistant: Surgeons and Role:  * No surgeons found with a matching role *    Indications:   Pre-op Diagnosis     * Nonrheumatic aortic (valve) stenosis [I35.0]     * Atherosclerotic heart disease of native coronary artery without angina pectoris [I25.10]    Post-procedure diagnosis:   Post-op Diagnosis     * Nonrheumatic aortic (valve) stenosis [I35.0]     * Atherosclerotic heart disease of native coronary artery without angina pectoris [I25.10]    Procedure(s):   Left & Right Heart Cath w Angiography & LV  41212 - OK R & L HRT CATH WINJX HRT ART& L VENTR IMG    Aortogram        Procedure Description and Findings:   4 [place] right common femoral artery access obtained using combination of image guidance from ultrasound and fluoroscopy  5 [place] right femoral venous access was also obtained using combination of image guidance from ultrasound and fluoroscopy, venous access was sequentially upsized to 6 and then 7 [place].  Uncomplicated left and right heart catheterization right femoral artery and vein approach  Elevated cardiac filling pressures with preserved cardiac output and index  Right dominant coronary circulation with relatively stable coronary artery disease in comparison to previous cath in 2021  Left main 30% distal  Left anterior descending artery diffuse 30-40 throughout all segments of the vessel  Circumflex previous stent remains widely patent, otherwise mild nonobstructive disease  Ramus intermedius 60% proximal  RCA ostioproximal       Complications:   None    Stents/Implants:   Implants       No implant documentation for this case.            Anticoagulation/Antiplatelet Plan:   None    Estimated Blood Loss:   10 mL    Anesthesia: Moderate Sedation Anesthesia Staff: No anesthesia staff entered.    Any Specimen(s) Removed:   No  specimens collected during this procedure.    Disposition:   Routine post-cath care likely discharge later today per nursing protocol.      Electronically signed by: Carl B Gillombardo, MD, 4/23/2024 12:50 PM

## 2024-04-23 NOTE — Clinical Note
Sheath was exchanged in the right femoral vein with SHEATH, PINNACLE, 10 CM,  5FR INTRODUCER, 5FR AMANDA, 2.5 CM DIALATOR. Ultrasound guidance was used.

## 2024-04-29 DIAGNOSIS — I25.10 CORONARY ARTERY DISEASE, UNSPECIFIED VESSEL OR LESION TYPE, UNSPECIFIED WHETHER ANGINA PRESENT, UNSPECIFIED WHETHER NATIVE OR TRANSPLANTED HEART: Primary | ICD-10-CM

## 2024-04-29 RX ORDER — ATORVASTATIN CALCIUM 80 MG/1
80 TABLET, FILM COATED ORAL NIGHTLY
Qty: 90 TABLET | Refills: 1 | Status: SHIPPED | OUTPATIENT
Start: 2024-04-29

## 2024-05-01 ENCOUNTER — OFFICE VISIT (OUTPATIENT)
Dept: PRIMARY CARE | Facility: CLINIC | Age: 84
End: 2024-05-01
Payer: MEDICARE

## 2024-05-01 VITALS
DIASTOLIC BLOOD PRESSURE: 63 MMHG | OXYGEN SATURATION: 98 % | HEART RATE: 64 BPM | SYSTOLIC BLOOD PRESSURE: 104 MMHG | WEIGHT: 128 LBS | TEMPERATURE: 97.3 F | BODY MASS INDEX: 25 KG/M2

## 2024-05-01 DIAGNOSIS — I25.10 CORONARY ARTERY DISEASE, UNSPECIFIED VESSEL OR LESION TYPE, UNSPECIFIED WHETHER ANGINA PRESENT, UNSPECIFIED WHETHER NATIVE OR TRANSPLANTED HEART: ICD-10-CM

## 2024-05-01 DIAGNOSIS — I35.0 NONRHEUMATIC AORTIC VALVE STENOSIS: Primary | ICD-10-CM

## 2024-05-01 DIAGNOSIS — E78.5 HYPERLIPIDEMIA, UNSPECIFIED HYPERLIPIDEMIA TYPE: ICD-10-CM

## 2024-05-01 DIAGNOSIS — I10 PRIMARY HYPERTENSION: ICD-10-CM

## 2024-05-01 PROCEDURE — 1160F RVW MEDS BY RX/DR IN RCRD: CPT | Performed by: INTERNAL MEDICINE

## 2024-05-01 PROCEDURE — 1159F MED LIST DOCD IN RCRD: CPT | Performed by: INTERNAL MEDICINE

## 2024-05-01 PROCEDURE — 99213 OFFICE O/P EST LOW 20 MIN: CPT | Performed by: INTERNAL MEDICINE

## 2024-05-01 PROCEDURE — 3078F DIAST BP <80 MM HG: CPT | Performed by: INTERNAL MEDICINE

## 2024-05-01 PROCEDURE — 1036F TOBACCO NON-USER: CPT | Performed by: INTERNAL MEDICINE

## 2024-05-01 PROCEDURE — 3074F SYST BP LT 130 MM HG: CPT | Performed by: INTERNAL MEDICINE

## 2024-05-01 ASSESSMENT — ENCOUNTER SYMPTOMS
CHILLS: 0
ABDOMINAL PAIN: 0
SHORTNESS OF BREATH: 0
FEVER: 0
NAUSEA: 0
VOMITING: 0

## 2024-05-01 ASSESSMENT — PATIENT HEALTH QUESTIONNAIRE - PHQ9
1. LITTLE INTEREST OR PLEASURE IN DOING THINGS: NOT AT ALL
2. FEELING DOWN, DEPRESSED OR HOPELESS: NOT AT ALL
SUM OF ALL RESPONSES TO PHQ9 QUESTIONS 1 AND 2: 0

## 2024-05-01 NOTE — PROGRESS NOTES
Subjective   Patient ID: Zabrina Syed is a 83 y.o. female who presents for No chief complaint on file..    Presents for follow up. Had R/LHC by Cards since last seen. Doing well.         Review of Systems   Constitutional:  Negative for chills and fever.   Respiratory:  Negative for shortness of breath.    Cardiovascular:  Negative for chest pain.   Gastrointestinal:  Negative for abdominal pain, nausea and vomiting.       Objective   There were no vitals taken for this visit.    Physical Exam  Gen appearance: well groomed in NAD  A & O: alert and oriented x 3  CV: RRR   Lungs: CTA bilaterally  Extr: no edema    Assessment/Plan   CAD: stable  HTN: stable  High Chol: check lipids  UTD Mamm  RTO July CPE

## 2024-05-08 ENCOUNTER — OFFICE VISIT (OUTPATIENT)
Dept: CARDIOLOGY | Facility: HOSPITAL | Age: 84
End: 2024-05-08
Payer: MEDICARE

## 2024-05-08 VITALS
SYSTOLIC BLOOD PRESSURE: 105 MMHG | HEART RATE: 71 BPM | DIASTOLIC BLOOD PRESSURE: 65 MMHG | HEIGHT: 64 IN | WEIGHT: 128 LBS | BODY MASS INDEX: 21.85 KG/M2

## 2024-05-08 DIAGNOSIS — Z98.890 STATUS POST CARDIAC CATHETERIZATION: Primary | ICD-10-CM

## 2024-05-08 LAB
ATRIAL RATE: 71 BPM
P AXIS: 86 DEGREES
P OFFSET: 175 MS
P ONSET: 130 MS
PR INTERVAL: 172 MS
Q ONSET: 216 MS
QRS COUNT: 11 BEATS
QRS DURATION: 126 MS
QT INTERVAL: 460 MS
QTC CALCULATION(BAZETT): 499 MS
QTC FREDERICIA: 486 MS
R AXIS: -43 DEGREES
T AXIS: 74 DEGREES
T OFFSET: 446 MS
VENTRICULAR RATE: 71 BPM

## 2024-05-08 PROCEDURE — 93005 ELECTROCARDIOGRAM TRACING: CPT | Performed by: INTERNAL MEDICINE

## 2024-05-08 PROCEDURE — 93010 ELECTROCARDIOGRAM REPORT: CPT | Performed by: INTERNAL MEDICINE

## 2024-05-08 PROCEDURE — 99214 OFFICE O/P EST MOD 30 MIN: CPT | Performed by: INTERNAL MEDICINE

## 2024-05-08 PROCEDURE — 1159F MED LIST DOCD IN RCRD: CPT | Performed by: INTERNAL MEDICINE

## 2024-05-08 PROCEDURE — 3078F DIAST BP <80 MM HG: CPT | Performed by: INTERNAL MEDICINE

## 2024-05-08 PROCEDURE — 1160F RVW MEDS BY RX/DR IN RCRD: CPT | Performed by: INTERNAL MEDICINE

## 2024-05-08 PROCEDURE — 1036F TOBACCO NON-USER: CPT | Performed by: INTERNAL MEDICINE

## 2024-05-08 PROCEDURE — 3074F SYST BP LT 130 MM HG: CPT | Performed by: INTERNAL MEDICINE

## 2024-05-08 NOTE — PROGRESS NOTES
Subjective:  Patient returns for a post cath follow-up.  Her catheterization did reveal an occluded RCA and a high-grade distal LAD lesion.  Her LCx stent was patent.  Her LV ejection fraction was mildly reduced.  Her aortic stenosis appeared to be more in the moderate range rather than the severe range.  She did have concomitant moderate aortic regurgitation.  She also had moderate mitral regurgitation.  Her right femoral cath site has healed well.    She generally feels she is doing well clinically at this time.  She requires a walker to get around due to her overall frailty.  She is not having any clear angina or dyspnea at her current activity level.  I do not get the sense that she is desiring to be more active than she is currently.  She is taking her medications compliantly and tolerating all of these well.  She denies any other new health concerns.    Objective:  General: Alert, pleasant frail elderly female.  HEENT: Unchanged.  Lungs: Generally clear with good air exchange.  Cardiac: Unchanged systolic murmur.  Abdomen: Nontender.  Extremities: No edema.  Skin: No rash.  Neuro: Unchanged.  Right femoral cath site: Well-healed.    EKG: Normal sinus rhythm.  LVH.    Lipid panel: Cholesterol-142, HDL-46, LDL-86, TG-53.    Impression/plan:  Patient presents with no significant cardiac symptomatology albeit at a rather limited functional level.  She has mixed aortic valve disease as well as moderate mitral regurgitation and some coronary disease with a mildly reduced ejection fraction.  It is not clear to me whether a TAVR would result in any significant improvement in her overall functional capacity given her other comorbidities and frailty.  I will thus continue with continued cautious observation.  I did not think we needed to embark on any additional cardiovascular testing.  I also did not think we needed to make any medication changes.  I will see her back in 4 months, but she knows to call for any  concerning symptomatology.  She did not need any prescriptions renewed.    Patient instructions:    Continue current medications unchanged.    Return to clinic in 4 months.

## 2024-05-24 DIAGNOSIS — I25.10 CORONARY ARTERY DISEASE INVOLVING NATIVE HEART, UNSPECIFIED VESSEL OR LESION TYPE, UNSPECIFIED WHETHER ANGINA PRESENT: ICD-10-CM

## 2024-05-27 RX ORDER — METOPROLOL TARTRATE 25 MG/1
12.5 TABLET, FILM COATED ORAL 2 TIMES DAILY
Qty: 90 TABLET | Refills: 1 | Status: SHIPPED | OUTPATIENT
Start: 2024-05-27

## 2024-07-03 ENCOUNTER — OFFICE VISIT (OUTPATIENT)
Dept: PRIMARY CARE | Facility: CLINIC | Age: 84
End: 2024-07-03
Payer: MEDICARE

## 2024-07-03 VITALS
SYSTOLIC BLOOD PRESSURE: 122 MMHG | RESPIRATION RATE: 16 BRPM | BODY MASS INDEX: 23.79 KG/M2 | HEIGHT: 61 IN | WEIGHT: 126 LBS | HEART RATE: 86 BPM | TEMPERATURE: 98.1 F | OXYGEN SATURATION: 98 % | DIASTOLIC BLOOD PRESSURE: 67 MMHG

## 2024-07-03 DIAGNOSIS — I25.10 CORONARY ARTERY DISEASE, UNSPECIFIED VESSEL OR LESION TYPE, UNSPECIFIED WHETHER ANGINA PRESENT, UNSPECIFIED WHETHER NATIVE OR TRANSPLANTED HEART: Primary | ICD-10-CM

## 2024-07-03 DIAGNOSIS — E78.5 HYPERLIPIDEMIA, UNSPECIFIED HYPERLIPIDEMIA TYPE: ICD-10-CM

## 2024-07-03 DIAGNOSIS — I10 PRIMARY HYPERTENSION: ICD-10-CM

## 2024-07-03 PROCEDURE — 99213 OFFICE O/P EST LOW 20 MIN: CPT | Performed by: INTERNAL MEDICINE

## 2024-07-03 PROCEDURE — 99215 OFFICE O/P EST HI 40 MIN: CPT | Performed by: INTERNAL MEDICINE

## 2024-07-03 SDOH — ECONOMIC STABILITY: FOOD INSECURITY: WITHIN THE PAST 12 MONTHS, THE FOOD YOU BOUGHT JUST DIDN'T LAST AND YOU DIDN'T HAVE MONEY TO GET MORE.: NEVER TRUE

## 2024-07-03 ASSESSMENT — ENCOUNTER SYMPTOMS
FEVER: 0
NAUSEA: 0
LOSS OF SENSATION IN FEET: 0
ABDOMINAL PAIN: 0
OCCASIONAL FEELINGS OF UNSTEADINESS: 0
VOMITING: 0
SHORTNESS OF BREATH: 0
DEPRESSION: 0
CHILLS: 0

## 2024-07-03 ASSESSMENT — LIFESTYLE VARIABLES: HOW MANY STANDARD DRINKS CONTAINING ALCOHOL DO YOU HAVE ON A TYPICAL DAY: PATIENT DOES NOT DRINK

## 2024-07-03 ASSESSMENT — PAIN SCALES - GENERAL: PAINLEVEL: 0-NO PAIN

## 2024-07-03 NOTE — PROGRESS NOTES
"Subjective   Patient ID: Zabrina Syed is a 83 y.o. female who presents for Follow-up.    Presents for follow up. No acute issues. Seeing Cards.          Review of Systems   Constitutional:  Negative for chills and fever.   Respiratory:  Negative for shortness of breath.    Cardiovascular:  Negative for chest pain.   Gastrointestinal:  Negative for abdominal pain, nausea and vomiting.       Objective   /67   Pulse 86   Temp 36.7 °C (98.1 °F)   Resp 16   Ht 1.549 m (5' 1\")   Wt 57.2 kg (126 lb)   SpO2 98%   BMI 23.81 kg/m²     Physical Exam  Gen appearance: well groomed in NAD  A & O: alert and oriented x 3  CV: RRR w/systolic murmur   Lungs: CTA bilaterally  Extr: no edema     Assessment/Plan   CAD: f/up with Cards  HTN: cont meds  High chol: cont me  HM: declines Mamm and colon  RRO 6 mos but come in sooner to office for flu shot. Discussed also going to a local pharmacy if better option  RTO 6 mos.          "

## 2024-07-30 ENCOUNTER — APPOINTMENT (OUTPATIENT)
Dept: OPHTHALMOLOGY | Facility: CLINIC | Age: 84
End: 2024-07-30
Payer: MEDICARE

## 2024-07-30 DIAGNOSIS — H40.1221 NORMAL TENSION GLAUCOMA OF LEFT EYE, MILD STAGE: Primary | ICD-10-CM

## 2024-07-30 DIAGNOSIS — H52.4 BILATERAL PRESBYOPIA: ICD-10-CM

## 2024-07-30 DIAGNOSIS — H04.123 BILATERAL DRY EYES: ICD-10-CM

## 2024-07-30 DIAGNOSIS — H52.03 HYPEROPIA OF BOTH EYES: ICD-10-CM

## 2024-07-30 DIAGNOSIS — H25.13 NUCLEAR SCLEROSIS OF BOTH EYES: ICD-10-CM

## 2024-07-30 DIAGNOSIS — H40.1212 NORMAL TENSION GLAUCOMA OF RIGHT EYE, MODERATE STAGE: ICD-10-CM

## 2024-07-30 DIAGNOSIS — B02.30 HERPES ZOSTER OPHTHALMICUS OF LEFT EYE: ICD-10-CM

## 2024-07-30 DIAGNOSIS — H16.223 KERATOCONJUNCTIVITIS SICCA OF BOTH EYES NOT SPECIFIED AS SJOGREN'S: ICD-10-CM

## 2024-07-30 PROCEDURE — 92015 DETERMINE REFRACTIVE STATE: CPT | Performed by: OPTOMETRIST

## 2024-07-30 PROCEDURE — 92133 CPTRZD OPH DX IMG PST SGM ON: CPT | Performed by: OPTOMETRIST

## 2024-07-30 PROCEDURE — 92083 EXTENDED VISUAL FIELD XM: CPT | Performed by: OPTOMETRIST

## 2024-07-30 PROCEDURE — 92014 COMPRE OPH EXAM EST PT 1/>: CPT | Performed by: OPTOMETRIST

## 2024-07-30 RX ORDER — CYCLOSPORINE 0.5 MG/ML
1 EMULSION OPHTHALMIC 2 TIMES DAILY
Qty: 180 ML | Refills: 1 | Status: SHIPPED | OUTPATIENT
Start: 2024-07-30

## 2024-07-30 ASSESSMENT — REFRACTION_WEARINGRX
OD_AXIS: 80
OS_SPHERE: +4.25
SPECS_TYPE: BIFOCAL
OD_ADD: +3.00
OD_CYLINDER: -1.00
OS_ADD: +3.00
OD_SPHERE: +4.50
OS_AXIS: 85
OS_CYLINDER: -0.75

## 2024-07-30 ASSESSMENT — VISUAL ACUITY
OD_CC: J1+
METHOD: SNELLEN - LINEAR
OS_BAT_HIGH: 20/40-1
OD_CC: 20/30-2
OD_BAT_HIGH: 20/30-2
OS_CC: 20/40
OS_CC: J1+

## 2024-07-30 ASSESSMENT — REFRACTION_MANIFEST
OD_CYLINDER: -1.00
OS_SPHERE: +4.25
OS_CYLINDER: -0.75
OD_AXIS: 80
OS_AXIS: 85
OS_SPHERE: +4.25
OD_SPHERE: +3.50
OS_ADD: +3.00
OD_ADD: +3.00
OS_CYLINDER: -1.00
OD_CYLINDER: -1.25
OD_AXIS: 85
OD_SPHERE: +4.50
OS_AXIS: 85

## 2024-07-30 ASSESSMENT — CONF VISUAL FIELD
OD_SUPERIOR_NASAL_RESTRICTION: 0
OS_NORMAL: 1
OS_SUPERIOR_TEMPORAL_RESTRICTION: 0
OD_SUPERIOR_TEMPORAL_RESTRICTION: 0
OS_INFERIOR_TEMPORAL_RESTRICTION: 0
OD_NORMAL: 1
OS_INFERIOR_NASAL_RESTRICTION: 0
OD_INFERIOR_TEMPORAL_RESTRICTION: 0
OS_SUPERIOR_NASAL_RESTRICTION: 0
OD_INFERIOR_NASAL_RESTRICTION: 0

## 2024-07-30 ASSESSMENT — TONOMETRY
OS_IOP_MMHG: 9
OD_IOP_MMHG: 8
IOP_METHOD: GOLDMANN APPLANATION

## 2024-07-30 ASSESSMENT — PACHYMETRY
OD_CT(UM): 577
OS_CT(UM): 599

## 2024-07-30 ASSESSMENT — ENCOUNTER SYMPTOMS: EYES NEGATIVE: 1

## 2024-07-30 ASSESSMENT — SLIT LAMP EXAM - LIDS
COMMENTS: GOOD POSITION
COMMENTS: GOOD POSITION

## 2024-07-30 ASSESSMENT — EXTERNAL EXAM - LEFT EYE: OS_EXAM: NORMAL

## 2024-07-30 ASSESSMENT — EXTERNAL EXAM - RIGHT EYE: OD_EXAM: NORMAL

## 2024-07-30 ASSESSMENT — CUP TO DISC RATIO
OS_RATIO: 0.75
OD_RATIO: 0.85

## 2024-07-30 NOTE — PROGRESS NOTES
VA corrects to 20/30 OD 20/40 OS BAT 20/30 OD 20/40 OS, was  20/25 OD and 20/20 OS.  BAT 20/25 OD and 20/20 OS. No Refractive change     NTG OD>OS with IOP excellent and at historical low levels 8/9 OU  Tmax 14, 15 on brimonidine OU BID.  Tmax Pachy adjust -2, -3.  CPM and new Rx sent in.     Optical coherence tomography of the retinal nerve fiber layer revealed:   OD avg 43 was 48 was 48 was 48 was 47 was 48 was 48 and was 51 5 years ago    OS 65 was 67 was 69 was 60 was 67 was 72 was 72 and was 74    some variability due to switching back and forth between cir[place] and [place] OCT.    A [place] 24-2 threshold visual field test was done.  Results were:  OD: stable small paracentral superior scotoma, pattern standard deviation 6.14 dB, mean deviation -10.62 dB  OS: the absence of scotoma, pattern standard deviation 1.64 dB, mean deviation -3.72 dB   Doing well and scotoma is stable OD.      Dry eyes and doing well with Restasis BID. RTc 6 months     Rtc 6 months for IOP and VF 24-2 and VA and BAT.   OCT RNFL VF 24-2 and IOP OU. CPM

## 2024-08-28 ENCOUNTER — APPOINTMENT (OUTPATIENT)
Dept: CARDIOLOGY | Facility: HOSPITAL | Age: 84
End: 2024-08-28
Payer: MEDICARE

## 2024-08-28 ENCOUNTER — OFFICE VISIT (OUTPATIENT)
Dept: CARDIOLOGY | Facility: HOSPITAL | Age: 84
End: 2024-08-28
Payer: MEDICARE

## 2024-08-28 VITALS
SYSTOLIC BLOOD PRESSURE: 124 MMHG | DIASTOLIC BLOOD PRESSURE: 71 MMHG | HEIGHT: 62 IN | HEART RATE: 70 BPM | BODY MASS INDEX: 22.38 KG/M2 | WEIGHT: 121.6 LBS | OXYGEN SATURATION: 96 %

## 2024-08-28 DIAGNOSIS — I35.0 NONRHEUMATIC AORTIC VALVE STENOSIS: Primary | ICD-10-CM

## 2024-08-28 DIAGNOSIS — E78.00 PURE HYPERCHOLESTEROLEMIA: ICD-10-CM

## 2024-08-28 DIAGNOSIS — I25.10 CORONARY ARTERY DISEASE INVOLVING NATIVE CORONARY ARTERY OF NATIVE HEART WITHOUT ANGINA PECTORIS: ICD-10-CM

## 2024-08-28 PROCEDURE — 3078F DIAST BP <80 MM HG: CPT | Performed by: NURSE PRACTITIONER

## 2024-08-28 PROCEDURE — 99214 OFFICE O/P EST MOD 30 MIN: CPT | Performed by: NURSE PRACTITIONER

## 2024-08-28 PROCEDURE — 1159F MED LIST DOCD IN RCRD: CPT | Performed by: NURSE PRACTITIONER

## 2024-08-28 PROCEDURE — 1036F TOBACCO NON-USER: CPT | Performed by: NURSE PRACTITIONER

## 2024-08-28 PROCEDURE — 3074F SYST BP LT 130 MM HG: CPT | Performed by: NURSE PRACTITIONER

## 2024-08-28 PROCEDURE — 1160F RVW MEDS BY RX/DR IN RCRD: CPT | Performed by: NURSE PRACTITIONER

## 2024-08-28 ASSESSMENT — ENCOUNTER SYMPTOMS
RESPIRATORY NEGATIVE: 1
CARDIOVASCULAR NEGATIVE: 1
CONSTITUTIONAL NEGATIVE: 1

## 2024-08-28 NOTE — PATIENT INSTRUCTIONS
Return in February with an echo on the same day    Continue all medication    Call for any concerns

## 2024-08-28 NOTE — PROGRESS NOTES
"Subjective   Zabrina Syed is a 84 y.o. female.    Chief Complaint:  No chief complaint on file.    HPI  Mrs. Syed is seen for followup.  She is seen in collaboration with Dr. Onofre.  She denies any recent hospitalizations or ED visits.  She denies any significant changes to her health.  She is compliant with all medication and appears to tolerate them well. She is active at her home and denies any symptoms referable to angina or heart failure.  She is reassured that her valve disease was \"not that bad\" after her cath in May. She has no cardiac concerns today.       Review of Systems   Constitutional: Negative.   Cardiovascular: Negative.    Respiratory: Negative.     Musculoskeletal:  Positive for arthritis.   All other systems reviewed and are negative.      Objective   Vitals reviewed.   Constitutional:       Appearance: Healthy appearance. Not in distress.   Neck:      Vascular: No JVR. JVD normal.   Pulmonary:      Effort: Pulmonary effort is normal.      Breath sounds: Normal breath sounds. No wheezing. No rhonchi. No rales.   Chest:      Chest wall: Not tender to palpatation.   Cardiovascular:      Normal rate. Regular rhythm. Normal S1. Normal S2.       Murmurs: There is a grade 2/6 systolic murmur.      No gallop.  No click. No rub.   Pulses:     Intact distal pulses.   Edema:     Peripheral edema absent.   Abdominal:      Tenderness: There is no abdominal tenderness.   Musculoskeletal: Normal range of motion.         General: No tenderness. Skin:     General: Skin is warm and dry.   Neurological:      General: No focal deficit present.      Mental Status: Alert and oriented to person, place and time.       Lab Review:   Lab Results   Component Value Date     04/22/2024    K 3.7 04/22/2024     04/22/2024    CO2 25 04/22/2024    BUN 26 (H) 04/22/2024    CREATININE 1.00 04/22/2024    GLUCOSE 100 (H) 04/22/2024    CALCIUM 9.5 04/22/2024     Lab Results   Component Value Date    WBC 6.7 " 04/22/2024    HGB 12.4 04/22/2024    HCT 37.3 04/22/2024    MCV 77 (L) 04/22/2024     04/22/2024     Lab Results   Component Value Date    CHOL 142 05/22/2023    TRIG 53 05/22/2023    HDL 45.6 05/22/2023       Assessment/Plan   Mrs. Syde is a pleasant 84-year-old female with past medical history significant for hypertension hyperlipidemia, carotid artery stenosis and moderate aortic stenosis, NSTEMI,  LCX PCI 11/22/2021. Recent cath 4/2024 showed moderate AS and patent stent.  She presents feeling well and is stable from a cardiac standpoint.  She remains limited in mobility but has no symptoms referable to angina.  VS are stable.  Labs are reviewed showing an improved LDL to 86 mgdL.  She is due for a FLP.  I would like her to continue her medications unchanged and obtain a fasting lipid panel in the next several days.  She will return for follow-up in February with an echocardiogram on the same day for convenience.  Mrs. Syed will call with any interim concerns or questions.

## 2024-09-11 ENCOUNTER — APPOINTMENT (OUTPATIENT)
Dept: CARDIOLOGY | Facility: HOSPITAL | Age: 84
End: 2024-09-11
Payer: MEDICARE

## 2024-10-06 ENCOUNTER — HOSPITAL ENCOUNTER (EMERGENCY)
Facility: HOSPITAL | Age: 84
Discharge: HOME | End: 2024-10-06
Payer: MEDICARE

## 2024-10-06 ENCOUNTER — APPOINTMENT (OUTPATIENT)
Dept: RADIOLOGY | Facility: HOSPITAL | Age: 84
End: 2024-10-06
Payer: MEDICARE

## 2024-10-06 VITALS
WEIGHT: 122 LBS | SYSTOLIC BLOOD PRESSURE: 119 MMHG | BODY MASS INDEX: 21.62 KG/M2 | HEIGHT: 63 IN | OXYGEN SATURATION: 99 % | TEMPERATURE: 98 F | HEART RATE: 70 BPM | RESPIRATION RATE: 16 BRPM | DIASTOLIC BLOOD PRESSURE: 50 MMHG

## 2024-10-06 DIAGNOSIS — M25.571 ACUTE RIGHT ANKLE PAIN: Primary | ICD-10-CM

## 2024-10-06 PROCEDURE — 73610 X-RAY EXAM OF ANKLE: CPT | Mod: RIGHT SIDE | Performed by: RADIOLOGY

## 2024-10-06 PROCEDURE — 73630 X-RAY EXAM OF FOOT: CPT | Mod: RIGHT SIDE | Performed by: RADIOLOGY

## 2024-10-06 PROCEDURE — 99284 EMERGENCY DEPT VISIT MOD MDM: CPT

## 2024-10-06 PROCEDURE — 2500000001 HC RX 250 WO HCPCS SELF ADMINISTERED DRUGS (ALT 637 FOR MEDICARE OP): Performed by: SURGERY

## 2024-10-06 PROCEDURE — 73610 X-RAY EXAM OF ANKLE: CPT | Mod: RT

## 2024-10-06 PROCEDURE — 73620 X-RAY EXAM OF FOOT: CPT | Mod: RT

## 2024-10-06 RX ORDER — IBUPROFEN 400 MG/1
800 TABLET ORAL ONCE
Status: COMPLETED | OUTPATIENT
Start: 2024-10-06 | End: 2024-10-06

## 2024-10-06 RX ADMIN — IBUPROFEN 800 MG: 400 TABLET, FILM COATED ORAL at 13:31

## 2024-10-06 ASSESSMENT — COLUMBIA-SUICIDE SEVERITY RATING SCALE - C-SSRS
6. HAVE YOU EVER DONE ANYTHING, STARTED TO DO ANYTHING, OR PREPARED TO DO ANYTHING TO END YOUR LIFE?: NO
2. HAVE YOU ACTUALLY HAD ANY THOUGHTS OF KILLING YOURSELF?: NO
1. IN THE PAST MONTH, HAVE YOU WISHED YOU WERE DEAD OR WISHED YOU COULD GO TO SLEEP AND NOT WAKE UP?: NO

## 2024-10-06 ASSESSMENT — PAIN DESCRIPTION - DESCRIPTORS
DESCRIPTORS: ACHING
DESCRIPTORS: ACHING

## 2024-10-06 ASSESSMENT — PAIN - FUNCTIONAL ASSESSMENT: PAIN_FUNCTIONAL_ASSESSMENT: 0-10

## 2024-10-06 ASSESSMENT — PAIN DESCRIPTION - PAIN TYPE: TYPE: ACUTE PAIN

## 2024-10-06 ASSESSMENT — PAIN SCALES - GENERAL
PAINLEVEL_OUTOF10: 5 - MODERATE PAIN
PAINLEVEL_OUTOF10: 4

## 2024-10-06 ASSESSMENT — PAIN DESCRIPTION - LOCATION: LOCATION: ANKLE

## 2024-10-06 ASSESSMENT — PAIN DESCRIPTION - ORIENTATION: ORIENTATION: RIGHT

## 2024-10-06 NOTE — ED TRIAGE NOTES
Presents with c/o right ankle pain and swelling when awaking this am. MSP intact, walks with steady gait. No known injury

## 2024-10-06 NOTE — ED PROVIDER NOTES
Chief Complaint   Patient presents with    Ankle Pain     HPI:   Zabrina Syed is an 84 y.o. female presenting to the ED for chief complaint of right ankle pain.  She explains when she woke this morning she has increased pain in the posterior aspect of her right foot that is worsened with weightbearing.  The pain is alleviated with rest.  States that she ran out of her ibuprofen today and thought she should be checked.  She denies specific injury.  No numbness or tingling.  No fever chills or sweats.  No nausea or vomiting.      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    Glaucoma, normal tension    Personal history of other diseases of the circulatory system 01/21/2020    History of carotid artery stenosis    Personal history of other medical treatment     History of mammogram     Past Surgical History:   Procedure Laterality Date    APPENDECTOMY  12/02/2014    Appendectomy    CARDIAC CATHETERIZATION N/A 4/23/2024    Procedure: Left & Right Heart Cath w Angiography & LV;  Surgeon: Alex Pacheco MD;  Location: [facility] Cardiac Cath Lab;  Service: Cardiovascular;  Laterality: N/A;  LEFT AND RIGHT HEART CATH TUES APRIL 23RD, 2024 AT 12:00 NOON PT WILL ARRIVE AT 10:30 AM @ [facility] - DR. PACHECO    CARPAL TUNNEL RELEASE  01/13/2016    Neuroplasty Median Nerve At Carpal Tunnel    INVASIVE VASCULAR PROCEDURE N/A 4/23/2024    Procedure: Aortogram;  Surgeon: Alex Pacheco MD;  Location: [facility] Cardiac Cath Lab;  Service: Cardiovascular;  Laterality: N/A;    SHOULDER SURGERY  12/02/2014    Shoulder Surgery     No family history on file.     Physical Exam  Vitals and nursing note reviewed.   Constitutional:       General: She is not in acute distress.     Appearance: She is well-developed.   HENT:      Head: Normocephalic and atraumatic.    Eyes:      Conjunctiva/sclera: Conjunctivae normal.   Cardiovascular:      Rate and Rhythm: Normal rate and regular rhythm.      Heart sounds: No murmur heard.  Pulmonary:      Effort: Pulmonary effort is normal. No respiratory distress.      Breath sounds: Normal breath sounds.   Abdominal:      Palpations: Abdomen is soft.      Tenderness: There is no abdominal tenderness.   Musculoskeletal:         General: No swelling.      Cervical back: Neck supple.      Comments: The right ankle moves smoothly and freely without pain.  She is tender over the the lateral malleolus and just inferior to it.  Ligaments are stable.  Good distal pulses.   Skin:     General: Skin is warm and dry.      Capillary Refill: Capillary refill takes less than 2 seconds.      Comments: There is no erythema, swelling or bruising or deformity   Neurological:      General: No focal deficit present.      Mental Status: She is alert and oriented to person, place, and time.   Psychiatric:         Mood and Affect: Mood normal.        VS: As documented in the triage note and EMR flowsheet from this visit were reviewed.    Medical Decision Making: This is an 84-year-old female presenting to the ED for chief complaint of right ankle pain that she had upon waking this morning.  Her pain is worse with weightbearing and alleviated with rest ice and elevation.  On exam her vitals are stable she is afebrile in no acute distress.  There is no significant edema of the area.  No erythema not a significant amount of tenderness.  Low suspicion for septic joint or cellulitis.  X-rays were obtained and showed no acute pathology.  Did show significant amount of arthritis however.  Patient was reassured by her results.  I did offer to send anti-inflammatory to the pharmacy which she declined.  The ankle was wrapped with an Ace bandage.  Instructed patient she can use ibuprofen over-the-counter for pain management.  Recommended she follow-up with her PCP for  outpatient management.  She is agreeable to the plan.    Counseling: Spoke with the patient and discussed today´s findings, in addition to providing specific details for the plan of care and expected course.  Patient was given the opportunity to ask questions.    Discussed return precautions and importance of follow-up.  Advised to follow-up with PCP.  I specifically advised to return to the ED for changing or worsening symptoms, new symptoms, complaint specific precautions, and precautions listed on the discharge paperwork.  Educated on the common potential side effects of medications prescribed.    I advised the patient that the emergency evaluation and treatment provided today doesn't end their need for medical care. It is very important that they follow-up with their primary care provider or other specialist as instructed.    The plan of care was mutually agreed upon with the patient. The patient and/or family were given the opportunity to ask questions. All questions asked today in the ED were answered to the best of my ability with today's information.    This patient was cared for in the setting of nationwide stress on resources and staffing.    This report was transcribed using voice recognition software.  Every effort was made to ensure accuracy, however, inadvertently computerized transcription errors may be present.       Aubrey Hercules PA-C  10/06/24 [id number]

## 2024-11-20 DIAGNOSIS — M54.12 CERVICAL RADICULOPATHY: ICD-10-CM

## 2024-11-20 DIAGNOSIS — I25.810 CORONARY ARTERY DISEASE INVOLVING CORONARY BYPASS GRAFT, UNSPECIFIED WHETHER ANGINA PRESENT, UNSPECIFIED WHETHER NATIVE OR TRANSPLANTED HEART: ICD-10-CM

## 2024-11-24 DIAGNOSIS — E78.5 HYPERLIPIDEMIA, UNSPECIFIED HYPERLIPIDEMIA TYPE: ICD-10-CM

## 2024-11-25 RX ORDER — EZETIMIBE 10 MG/1
TABLET ORAL
Qty: 90 TABLET | Refills: 2 | Status: SHIPPED | OUTPATIENT
Start: 2024-11-25

## 2025-01-03 ENCOUNTER — TELEMEDICINE (OUTPATIENT)
Dept: PRIMARY CARE | Facility: CLINIC | Age: 85
End: 2025-01-03
Payer: MEDICARE

## 2025-01-03 DIAGNOSIS — I10 PRIMARY HYPERTENSION: ICD-10-CM

## 2025-01-03 DIAGNOSIS — I25.10 CORONARY ARTERY DISEASE, UNSPECIFIED VESSEL OR LESION TYPE, UNSPECIFIED WHETHER ANGINA PRESENT, UNSPECIFIED WHETHER NATIVE OR TRANSPLANTED HEART: Primary | ICD-10-CM

## 2025-01-03 DIAGNOSIS — R73.9 HYPERGLYCEMIA: ICD-10-CM

## 2025-01-03 DIAGNOSIS — E78.5 HYPERLIPIDEMIA, UNSPECIFIED HYPERLIPIDEMIA TYPE: ICD-10-CM

## 2025-01-03 PROCEDURE — 99214 OFFICE O/P EST MOD 30 MIN: CPT | Performed by: INTERNAL MEDICINE

## 2025-01-03 PROCEDURE — G2211 COMPLEX E/M VISIT ADD ON: HCPCS | Performed by: INTERNAL MEDICINE

## 2025-01-03 NOTE — PROGRESS NOTES
Subjective   Patient ID: Zabrina Syed is a 84 y.o. female who presents for virtual visit. Virtual or Telephone Consent    A telephone visit (audio only) between the patient (at the originating site) and the provider (at the distant site) was utilized to provide this telehealth service.   Verbal consent was requested and obtained from Zabrina Syed on this date, 01/03/25 for a telehealth visit.     HPI: Spoke with patient. No acute issues. Still trying to take care of her ill son.      Review of Systems   Constitutional:  Negative for chills and fever.   Respiratory:  Negative for shortness of breath.    Cardiovascular:  Negative for chest pain.   Gastrointestinal:  Negative for abdominal pain, nausea and vomiting.       Objective   There were no vitals taken for this visit.    Physical Exam  Virtual visit.     Assessment/Plan   CAD: f/up with Cards  HTN: cont meds  High chol: cont meds  Arthritis: stable  Will order labs that you can get drawn at your upcoming appointment with Cards next month.  6.   RTO 6 mos

## 2025-01-07 ASSESSMENT — ENCOUNTER SYMPTOMS
CHILLS: 0
SHORTNESS OF BREATH: 0
ABDOMINAL PAIN: 0
VOMITING: 0
NAUSEA: 0
FEVER: 0

## 2025-02-13 ENCOUNTER — APPOINTMENT (OUTPATIENT)
Dept: OPHTHALMOLOGY | Facility: CLINIC | Age: 85
End: 2025-02-13
Payer: MEDICARE

## 2025-02-13 DIAGNOSIS — H40.1221 NORMAL TENSION GLAUCOMA OF LEFT EYE, MILD STAGE: Primary | ICD-10-CM

## 2025-02-13 PROCEDURE — 92133 CPTRZD OPH DX IMG PST SGM ON: CPT | Performed by: OPTOMETRIST

## 2025-02-13 PROCEDURE — 92012 INTRM OPH EXAM EST PATIENT: CPT | Performed by: OPTOMETRIST

## 2025-02-13 PROCEDURE — 92083 EXTENDED VISUAL FIELD XM: CPT | Performed by: OPTOMETRIST

## 2025-02-13 ASSESSMENT — TONOMETRY
OS_IOP_MMHG: 9
IOP_METHOD: GOLDMANN APPLANATION
OD_IOP_MMHG: 9

## 2025-02-13 ASSESSMENT — VISUAL ACUITY
OD_PH_CC: 20/30
OD_CC+: +2
OS_BAT_MED: 20/30
OS_CC: 20/20
OS_CC+: -1
OD_CC: 20/40
OD_BAT_MED: 20/40
CORRECTION_TYPE: GLASSES
METHOD: SNELLEN - LINEAR

## 2025-02-13 ASSESSMENT — REFRACTION_WEARINGRX
SPECS_TYPE: BIFOCAL
OD_ADD: +3.00
OD_CYLINDER: -1.00
OS_CYLINDER: -0.75
OS_ADD: +3.00
OD_SPHERE: +4.50
OS_SPHERE: +4.25
OD_AXIS: 80
OS_AXIS: 85

## 2025-02-13 ASSESSMENT — CUP TO DISC RATIO
OD_RATIO: 0.85
OS_RATIO: 0.75

## 2025-02-13 ASSESSMENT — PACHYMETRY
OD_CT(UM): 577
OS_CT(UM): 599

## 2025-02-13 ASSESSMENT — SLIT LAMP EXAM - LIDS
COMMENTS: GOOD POSITION
COMMENTS: GOOD POSITION

## 2025-02-13 ASSESSMENT — EXTERNAL EXAM - LEFT EYE: OS_EXAM: NORMAL

## 2025-02-13 ASSESSMENT — EXTERNAL EXAM - RIGHT EYE: OD_EXAM: NORMAL

## 2025-02-13 NOTE — PROGRESS NOTES
[facility] corrects to 20/30 20/40 OD/OS BAT 20/40 20/30 OD/OS.     NTG OD>OS with IOP excellent and at historical low levels 9/9 OU  Tmax 14, 15 on brimonidine OU BID.  Tmax Pachy adjust -2, -3.  CPM and new Rx sent in.     Optical coherence tomography of the retinal nerve fiber layer revealed:   OD avg 43 was 43 was 48 was 48 was 48 was 47 was 48 was 48 and was 51 5 years ago    OS avg 64 was 65 was 67 was 69 was 60 was 67 was 72 was 72 and was 74    some variability due to switching back and forth between cirrus and [employer] OCT.    A Alford 24-2 threshold visual field test was done.  Results were:  OD: stable small paracentral superior scotoma, pattern standard deviation 6.70 was 6.14 dB, mean deviation -7.56 was -10.62 dB  OS: the absence of scotoma, pattern standard deviation 2.23 was 1.64 dB, mean deviation -1.67 was -3.72 dB   Doing well and scotoma is stable OD.      Dry eyes and doing well with Restasis BID. RTc 6 months   CPM  Rtc 6 months for manifest refraction (MR) BAT IOP OU and DFE OCT RNFL.

## 2025-02-24 DIAGNOSIS — I25.10 CORONARY ARTERY DISEASE, UNSPECIFIED VESSEL OR LESION TYPE, UNSPECIFIED WHETHER ANGINA PRESENT, UNSPECIFIED WHETHER NATIVE OR TRANSPLANTED HEART: ICD-10-CM

## 2025-02-24 RX ORDER — CLOPIDOGREL BISULFATE 75 MG/1
75 TABLET ORAL DAILY
Qty: 90 TABLET | Refills: 3 | Status: SHIPPED | OUTPATIENT
Start: 2025-02-24

## 2025-02-26 ENCOUNTER — HOSPITAL ENCOUNTER (OUTPATIENT)
Dept: CARDIOLOGY | Facility: HOSPITAL | Age: 85
Discharge: HOME | End: 2025-02-26
Payer: MEDICARE

## 2025-02-26 ENCOUNTER — OFFICE VISIT (OUTPATIENT)
Dept: CARDIOLOGY | Facility: HOSPITAL | Age: 85
End: 2025-02-26
Payer: MEDICARE

## 2025-02-26 VITALS
SYSTOLIC BLOOD PRESSURE: 130 MMHG | HEIGHT: 63 IN | WEIGHT: 123 LBS | BODY MASS INDEX: 21.79 KG/M2 | HEART RATE: 79 BPM | DIASTOLIC BLOOD PRESSURE: 75 MMHG

## 2025-02-26 DIAGNOSIS — I25.10 CORONARY ARTERY DISEASE, UNSPECIFIED VESSEL OR LESION TYPE, UNSPECIFIED WHETHER ANGINA PRESENT, UNSPECIFIED WHETHER NATIVE OR TRANSPLANTED HEART: Primary | ICD-10-CM

## 2025-02-26 DIAGNOSIS — I35.0 NONRHEUMATIC AORTIC VALVE STENOSIS: ICD-10-CM

## 2025-02-26 LAB
AORTIC VALVE MEAN GRADIENT: 31 MMHG
AORTIC VALVE PEAK VELOCITY: 3.71 M/S
ATRIAL RATE: 79 BPM
AV PEAK GRADIENT: 55 MMHG
AVA (PEAK VEL): 1.23 CM2
AVA (VTI): 1.26 CM2
EJECTION FRACTION APICAL 4 CHAMBER: 40.8
EJECTION FRACTION: 45 %
LEFT ATRIUM VOLUME AREA LENGTH INDEX BSA: 53.3 ML/M2
LEFT VENTRICLE INTERNAL DIMENSION DIASTOLE: 4.23 CM (ref 3.5–6)
LEFT VENTRICULAR OUTFLOW TRACT DIAMETER: 2.9 CM
MITRAL VALVE E/A RATIO: 1.76
P AXIS: 74 DEGREES
P OFFSET: 170 MS
P ONSET: 132 MS
PR INTERVAL: 172 MS
Q ONSET: 218 MS
QRS COUNT: 13 BEATS
QRS DURATION: 114 MS
QT INTERVAL: 420 MS
QTC CALCULATION(BAZETT): 481 MS
QTC FREDERICIA: 460 MS
R AXIS: -31 DEGREES
RIGHT VENTRICLE FREE WALL PEAK S': 9 CM/S
RIGHT VENTRICLE PEAK SYSTOLIC PRESSURE: 59.3 MMHG
T AXIS: 99 DEGREES
T OFFSET: 428 MS
TRICUSPID ANNULAR PLANE SYSTOLIC EXCURSION: 1.7 CM
VENTRICULAR RATE: 79 BPM

## 2025-02-26 PROCEDURE — 93306 TTE W/DOPPLER COMPLETE: CPT | Performed by: INTERNAL MEDICINE

## 2025-02-26 PROCEDURE — 3078F DIAST BP <80 MM HG: CPT | Performed by: INTERNAL MEDICINE

## 2025-02-26 PROCEDURE — 3075F SYST BP GE 130 - 139MM HG: CPT | Performed by: INTERNAL MEDICINE

## 2025-02-26 PROCEDURE — 93010 ELECTROCARDIOGRAM REPORT: CPT | Performed by: INTERNAL MEDICINE

## 2025-02-26 PROCEDURE — 93005 ELECTROCARDIOGRAM TRACING: CPT | Performed by: INTERNAL MEDICINE

## 2025-02-26 PROCEDURE — 99214 OFFICE O/P EST MOD 30 MIN: CPT | Performed by: INTERNAL MEDICINE

## 2025-02-26 PROCEDURE — 93306 TTE W/DOPPLER COMPLETE: CPT

## 2025-02-26 PROCEDURE — 1160F RVW MEDS BY RX/DR IN RCRD: CPT | Performed by: INTERNAL MEDICINE

## 2025-02-26 PROCEDURE — 1036F TOBACCO NON-USER: CPT | Performed by: INTERNAL MEDICINE

## 2025-02-26 PROCEDURE — 1159F MED LIST DOCD IN RCRD: CPT | Performed by: INTERNAL MEDICINE

## 2025-02-26 PROCEDURE — 99214 OFFICE O/P EST MOD 30 MIN: CPT | Mod: 25 | Performed by: INTERNAL MEDICINE

## 2025-02-26 NOTE — PROGRESS NOTES
Subjective:  Patient returns for a routine 6-month follow-up.  She is a pleasant but frail elderly female we follow for hypertension, hyperlipidemia, coronary disease, and aortic valve disease.    She generally has been clinically stable since her last visit 6 months ago.  A repeat catheterization last year showed a nicely patent LCx stent that was placed for an NSTEMI in 2021.  She had  high-grade distal LAD disease as well as an occluded RCA.  Her LV ejection fraction was mildly reduced.    She comes in today for routine follow-up.  She did have a repeat echo done to reassess her aortic stenosis.  She generally feels clinically stable at this time.  She is fairly limited and gets around much of the time with a wheelchair.  She is anticipating a trip to [place] in the near future and was requesting a letter to be sure that the airline would give her a wheelchair.    She denies any chest discomfort or dyspnea at this limited activity level.  She denies any palpitations, presyncope or syncope.  She denies any stroke or TIA symptomatology.  She overall is reasonably comfortable with how she is doing.    She has not had any hospitalizations.  She denies any other new health concerns.  She is taking all of her medications compliantly and is generally tolerating them well.    Objective:  General: Alert, usual self.  HEENT: Unchanged.  Lungs: Generally clear with good air exchange.  Cardiac: Distant heart tones with 4/6 systolic murmur.  Abdomen: Nontender with normal bowel sounds.  Extremities: Unchanged.  Skin: No rash.  Neuro: No gross changes.    EKG: Normal sinus rhythm.  Incomplete right bundle branch block.  LVH.    Lipid panel: Cholesterol-142, HDL-46, LDL-86, TG-53.    Impression/plan:  Zabrina remains somewhat frail and tenuous but overall reasonably stable.  She is not having any anginal type symptoms to suggest progressive coronary disease.  Her echocardiogram shows moderate to severe aortic valve stenosis with  moderate pulmonary hypertension.  Given her overall frailty, I am not inclined to be overly aggressive for considering potential aortic valve intervention.    Her blood pressure is in good range at this time, so we will continue her current medications unchanged.    Her lipid panel is also under reasonably good control on combination therapy.    She did not need any prescriptions renewed.  I will see her back for routine follow-up in 6 months, but she knows to call for any intercurrent concerns particularly anything that would prompt us to reconsider whether we potentially need to consider TAVR for her aortic stenosis.    Patient instructions:    Continue current medications unchanged.    Return to clinic in 6 months.    Call for any intercurrent problems.

## 2025-03-18 LAB
ALBUMIN SERPL-MCNC: 3.8 G/DL (ref 3.6–5.1)
ALP SERPL-CCNC: 95 U/L (ref 37–153)
ALT SERPL-CCNC: 18 U/L (ref 6–29)
ANION GAP SERPL CALCULATED.4IONS-SCNC: 12 MMOL/L (CALC) (ref 7–17)
AST SERPL-CCNC: 29 U/L (ref 10–35)
BILIRUB SERPL-MCNC: 0.3 MG/DL (ref 0.2–1.2)
BUN SERPL-MCNC: 23 MG/DL (ref 7–25)
CALCIUM SERPL-MCNC: 9.7 MG/DL (ref 8.6–10.4)
CHLORIDE SERPL-SCNC: 107 MMOL/L (ref 98–110)
CHOLEST SERPL-MCNC: 164 MG/DL
CHOLEST/HDLC SERPL: 2.9 (CALC)
CO2 SERPL-SCNC: 22 MMOL/L (ref 20–32)
CREAT SERPL-MCNC: 0.89 MG/DL (ref 0.6–0.95)
EGFRCR SERPLBLD CKD-EPI 2021: 63 ML/MIN/1.73M2
ERYTHROCYTE [DISTWIDTH] IN BLOOD BY AUTOMATED COUNT: 15.6 % (ref 11–15)
EST. AVERAGE GLUCOSE BLD GHB EST-MCNC: 108 MG/DL
EST. AVERAGE GLUCOSE BLD GHB EST-SCNC: 6 MMOL/L
GLUCOSE SERPL-MCNC: 87 MG/DL (ref 65–99)
HBA1C MFR BLD: 5.4 % OF TOTAL HGB
HCT VFR BLD AUTO: 37.9 % (ref 35–45)
HDLC SERPL-MCNC: 56 MG/DL
HGB BLD-MCNC: 12.2 G/DL (ref 11.7–15.5)
LDLC SERPL CALC-MCNC: 92 MG/DL (CALC)
MCH RBC QN AUTO: 26.7 PG (ref 27–33)
MCHC RBC AUTO-ENTMCNC: 32.2 G/DL (ref 32–36)
MCV RBC AUTO: 82.9 FL (ref 80–100)
NONHDLC SERPL-MCNC: 108 MG/DL (CALC)
PLATELET # BLD AUTO: 188 THOUSAND/UL (ref 140–400)
PMV BLD REES-ECKER: 13.3 FL (ref 7.5–12.5)
POTASSIUM SERPL-SCNC: 4.1 MMOL/L (ref 3.5–5.3)
PROT SERPL-MCNC: 7.1 G/DL (ref 6.1–8.1)
RBC # BLD AUTO: 4.57 MILLION/UL (ref 3.8–5.1)
SODIUM SERPL-SCNC: 141 MMOL/L (ref 135–146)
TRIGL SERPL-MCNC: 74 MG/DL
WBC # BLD AUTO: 5.1 THOUSAND/UL (ref 3.8–10.8)

## 2025-03-21 ENCOUNTER — TELEPHONE (OUTPATIENT)
Dept: CARDIOLOGY | Facility: CLINIC | Age: 85
End: 2025-03-21
Payer: MEDICARE

## 2025-04-09 ENCOUNTER — HOSPITAL ENCOUNTER (INPATIENT)
Facility: HOSPITAL | Age: 85
LOS: 1 days | Discharge: HOME | End: 2025-04-10
Attending: EMERGENCY MEDICINE | Admitting: INTERNAL MEDICINE
Payer: MEDICARE

## 2025-04-09 ENCOUNTER — APPOINTMENT (OUTPATIENT)
Dept: RADIOLOGY | Facility: HOSPITAL | Age: 85
End: 2025-04-09
Payer: MEDICARE

## 2025-04-09 DIAGNOSIS — G45.9 TIA (TRANSIENT ISCHEMIC ATTACK): ICD-10-CM

## 2025-04-09 DIAGNOSIS — I63.89: Primary | ICD-10-CM

## 2025-04-09 DIAGNOSIS — I25.10 CORONARY ARTERY DISEASE, UNSPECIFIED VESSEL OR LESION TYPE, UNSPECIFIED WHETHER ANGINA PRESENT, UNSPECIFIED WHETHER NATIVE OR TRANSPLANTED HEART: ICD-10-CM

## 2025-04-09 DIAGNOSIS — I69.398 VERTIGO AS LATE EFFECT OF CEREBROVASCULAR ACCIDENT (CVA): ICD-10-CM

## 2025-04-09 DIAGNOSIS — R42 VERTIGO AS LATE EFFECT OF CEREBROVASCULAR ACCIDENT (CVA): ICD-10-CM

## 2025-04-09 LAB
ALBUMIN SERPL BCP-MCNC: 3.5 G/DL (ref 3.4–5)
ALP SERPL-CCNC: 85 U/L (ref 33–136)
ALT SERPL W P-5'-P-CCNC: 25 U/L (ref 7–45)
ANION GAP SERPL CALC-SCNC: 10 MMOL/L (ref 10–20)
APTT PPP: 30 SECONDS (ref 26–36)
AST SERPL W P-5'-P-CCNC: 37 U/L (ref 9–39)
BASOPHILS # BLD AUTO: 0.01 X10*3/UL (ref 0–0.1)
BASOPHILS NFR BLD AUTO: 0.2 %
BILIRUB SERPL-MCNC: 0.4 MG/DL (ref 0–1.2)
BUN SERPL-MCNC: 22 MG/DL (ref 6–23)
CALCIUM SERPL-MCNC: 9.4 MG/DL (ref 8.6–10.3)
CARDIAC TROPONIN I PNL SERPL HS: 22 NG/L (ref 0–13)
CHLORIDE SERPL-SCNC: 109 MMOL/L (ref 98–107)
CHOLEST SERPL-MCNC: 138 MG/DL (ref 0–199)
CHOLESTEROL/HDL RATIO: 2.6
CO2 SERPL-SCNC: 27 MMOL/L (ref 21–32)
CREAT SERPL-MCNC: 0.83 MG/DL (ref 0.5–1.05)
EGFRCR SERPLBLD CKD-EPI 2021: 69 ML/MIN/1.73M*2
EOSINOPHIL # BLD AUTO: 0.29 X10*3/UL (ref 0–0.4)
EOSINOPHIL NFR BLD AUTO: 4.8 %
ERYTHROCYTE [DISTWIDTH] IN BLOOD BY AUTOMATED COUNT: 15.8 % (ref 11.5–14.5)
GLUCOSE BLD MANUAL STRIP-MCNC: 124 MG/DL (ref 74–99)
GLUCOSE SERPL-MCNC: 124 MG/DL (ref 74–99)
HCT VFR BLD AUTO: 31.4 % (ref 36–46)
HDLC SERPL-MCNC: 53.4 MG/DL
HGB BLD-MCNC: 10.7 G/DL (ref 12–16)
IMM GRANULOCYTES # BLD AUTO: 0.01 X10*3/UL (ref 0–0.5)
IMM GRANULOCYTES NFR BLD AUTO: 0.2 % (ref 0–0.9)
INR PPP: 1.2 (ref 0.9–1.1)
LDLC SERPL CALC-MCNC: 75 MG/DL
LYMPHOCYTES # BLD AUTO: 2.38 X10*3/UL (ref 0.8–3)
LYMPHOCYTES NFR BLD AUTO: 39.3 %
MAGNESIUM SERPL-MCNC: 1.73 MG/DL (ref 1.6–2.4)
MCH RBC QN AUTO: 26.9 PG (ref 26–34)
MCHC RBC AUTO-ENTMCNC: 34.1 G/DL (ref 32–36)
MCV RBC AUTO: 79 FL (ref 80–100)
MONOCYTES # BLD AUTO: 0.51 X10*3/UL (ref 0.05–0.8)
MONOCYTES NFR BLD AUTO: 8.4 %
NEUTROPHILS # BLD AUTO: 2.86 X10*3/UL (ref 1.6–5.5)
NEUTROPHILS NFR BLD AUTO: 47.1 %
NON HDL CHOLESTEROL: 85 MG/DL (ref 0–149)
NRBC BLD-RTO: 0 /100 WBCS (ref 0–0)
PLATELET # BLD AUTO: 186 X10*3/UL (ref 150–450)
POTASSIUM SERPL-SCNC: 3.9 MMOL/L (ref 3.5–5.3)
PROT SERPL-MCNC: 6.6 G/DL (ref 6.4–8.2)
PROTHROMBIN TIME: 13.1 SECONDS (ref 9.8–12.4)
RBC # BLD AUTO: 3.98 X10*6/UL (ref 4–5.2)
SODIUM SERPL-SCNC: 142 MMOL/L (ref 136–145)
T4 FREE SERPL-MCNC: 1.05 NG/DL (ref 0.61–1.12)
TRIGL SERPL-MCNC: 49 MG/DL (ref 0–149)
TSH SERPL-ACNC: <37.5 MIU/L (ref 0.44–3.98)
VLDL: 10 MG/DL (ref 0–40)
WBC # BLD AUTO: 6.1 X10*3/UL (ref 4.4–11.3)

## 2025-04-09 PROCEDURE — 99221 1ST HOSP IP/OBS SF/LOW 40: CPT

## 2025-04-09 PROCEDURE — 70450 CT HEAD/BRAIN W/O DYE: CPT

## 2025-04-09 PROCEDURE — 85025 COMPLETE CBC W/AUTO DIFF WBC: CPT | Performed by: EMERGENCY MEDICINE

## 2025-04-09 PROCEDURE — 70450 CT HEAD/BRAIN W/O DYE: CPT | Performed by: RADIOLOGY

## 2025-04-09 PROCEDURE — 85610 PROTHROMBIN TIME: CPT | Performed by: EMERGENCY MEDICINE

## 2025-04-09 PROCEDURE — 99285 EMERGENCY DEPT VISIT HI MDM: CPT | Mod: 25 | Performed by: EMERGENCY MEDICINE

## 2025-04-09 PROCEDURE — 36415 COLL VENOUS BLD VENIPUNCTURE: CPT | Performed by: EMERGENCY MEDICINE

## 2025-04-09 PROCEDURE — 84443 ASSAY THYROID STIM HORMONE: CPT | Performed by: EMERGENCY MEDICINE

## 2025-04-09 PROCEDURE — G0378 HOSPITAL OBSERVATION PER HR: HCPCS

## 2025-04-09 PROCEDURE — 82947 ASSAY GLUCOSE BLOOD QUANT: CPT

## 2025-04-09 PROCEDURE — 83036 HEMOGLOBIN GLYCOSYLATED A1C: CPT | Mod: AHULAB

## 2025-04-09 PROCEDURE — 83880 ASSAY OF NATRIURETIC PEPTIDE: CPT

## 2025-04-09 PROCEDURE — 80053 COMPREHEN METABOLIC PANEL: CPT | Performed by: EMERGENCY MEDICINE

## 2025-04-09 PROCEDURE — 83735 ASSAY OF MAGNESIUM: CPT | Performed by: EMERGENCY MEDICINE

## 2025-04-09 PROCEDURE — 84484 ASSAY OF TROPONIN QUANT: CPT | Performed by: EMERGENCY MEDICINE

## 2025-04-09 PROCEDURE — 84439 ASSAY OF FREE THYROXINE: CPT | Performed by: EMERGENCY MEDICINE

## 2025-04-09 PROCEDURE — 80061 LIPID PANEL: CPT

## 2025-04-09 RX ORDER — PANTOPRAZOLE SODIUM 40 MG/1
40 TABLET, DELAYED RELEASE ORAL
Status: DISCONTINUED | OUTPATIENT
Start: 2025-04-10 | End: 2025-04-10 | Stop reason: HOSPADM

## 2025-04-09 RX ORDER — LABETALOL HYDROCHLORIDE 5 MG/ML
10 INJECTION, SOLUTION INTRAVENOUS EVERY 10 MIN PRN
Status: DISCONTINUED | OUTPATIENT
Start: 2025-04-09 | End: 2025-04-10 | Stop reason: HOSPADM

## 2025-04-09 RX ORDER — DOCUSATE SODIUM 100 MG/1
100 CAPSULE, LIQUID FILLED ORAL 2 TIMES DAILY
Status: DISCONTINUED | OUTPATIENT
Start: 2025-04-09 | End: 2025-04-10 | Stop reason: HOSPADM

## 2025-04-09 RX ORDER — ACETAMINOPHEN 325 MG/1
650 TABLET ORAL EVERY 4 HOURS PRN
Status: DISCONTINUED | OUTPATIENT
Start: 2025-04-09 | End: 2025-04-10 | Stop reason: HOSPADM

## 2025-04-09 RX ORDER — POLYETHYLENE GLYCOL 3350 17 G/17G
17 POWDER, FOR SOLUTION ORAL DAILY
Status: DISCONTINUED | OUTPATIENT
Start: 2025-04-10 | End: 2025-04-10 | Stop reason: HOSPADM

## 2025-04-09 RX ORDER — ACETAMINOPHEN 160 MG/5ML
650 SOLUTION ORAL EVERY 4 HOURS PRN
Status: DISCONTINUED | OUTPATIENT
Start: 2025-04-09 | End: 2025-04-10 | Stop reason: HOSPADM

## 2025-04-09 RX ORDER — PANTOPRAZOLE SODIUM 40 MG/10ML
40 INJECTION, POWDER, LYOPHILIZED, FOR SOLUTION INTRAVENOUS
Status: DISCONTINUED | OUTPATIENT
Start: 2025-04-10 | End: 2025-04-10 | Stop reason: HOSPADM

## 2025-04-09 RX ORDER — ACETAMINOPHEN 650 MG/1
650 SUPPOSITORY RECTAL EVERY 4 HOURS PRN
Status: DISCONTINUED | OUTPATIENT
Start: 2025-04-09 | End: 2025-04-10 | Stop reason: HOSPADM

## 2025-04-09 RX ORDER — ENOXAPARIN SODIUM 100 MG/ML
40 INJECTION SUBCUTANEOUS EVERY 24 HOURS
Status: DISCONTINUED | OUTPATIENT
Start: 2025-04-10 | End: 2025-04-10 | Stop reason: HOSPADM

## 2025-04-09 ASSESSMENT — PAIN SCALES - GENERAL
PAINLEVEL_OUTOF10: 1
PAINLEVEL_OUTOF10: 0 - NO PAIN

## 2025-04-09 ASSESSMENT — PAIN - FUNCTIONAL ASSESSMENT: PAIN_FUNCTIONAL_ASSESSMENT: 0-10

## 2025-04-09 ASSESSMENT — PAIN DESCRIPTION - PAIN TYPE: TYPE: ACUTE PAIN

## 2025-04-09 ASSESSMENT — PAIN DESCRIPTION - LOCATION: LOCATION: ARM

## 2025-04-09 ASSESSMENT — PAIN DESCRIPTION - DESCRIPTORS: DESCRIPTORS: NUMBNESS

## 2025-04-09 ASSESSMENT — PAIN DESCRIPTION - ORIENTATION: ORIENTATION: RIGHT

## 2025-04-10 ENCOUNTER — APPOINTMENT (OUTPATIENT)
Dept: CARDIOLOGY | Facility: HOSPITAL | Age: 85
End: 2025-04-10
Payer: MEDICARE

## 2025-04-10 ENCOUNTER — PHARMACY VISIT (OUTPATIENT)
Dept: PHARMACY | Facility: CLINIC | Age: 85
End: 2025-04-10
Payer: COMMERCIAL

## 2025-04-10 ENCOUNTER — APPOINTMENT (OUTPATIENT)
Dept: VASCULAR MEDICINE | Facility: HOSPITAL | Age: 85
End: 2025-04-10
Payer: MEDICARE

## 2025-04-10 ENCOUNTER — APPOINTMENT (OUTPATIENT)
Dept: RADIOLOGY | Facility: HOSPITAL | Age: 85
End: 2025-04-10
Payer: MEDICARE

## 2025-04-10 VITALS
BODY MASS INDEX: 20.97 KG/M2 | HEART RATE: 74 BPM | SYSTOLIC BLOOD PRESSURE: 146 MMHG | DIASTOLIC BLOOD PRESSURE: 83 MMHG | RESPIRATION RATE: 18 BRPM | TEMPERATURE: 97.7 F | OXYGEN SATURATION: 93 % | WEIGHT: 113.98 LBS | HEIGHT: 62 IN

## 2025-04-10 PROBLEM — I63.89: Status: ACTIVE | Noted: 2025-04-09

## 2025-04-10 LAB
ANION GAP SERPL CALC-SCNC: 8 MMOL/L (ref 10–20)
AORTIC VALVE PEAK VELOCITY: 2.4 M/S
APPEARANCE UR: CLEAR
ATRIAL RATE: 69 BPM
AV PEAK GRADIENT: 23 MMHG
BACTERIA #/AREA URNS AUTO: ABNORMAL /HPF
BASOPHILS # BLD AUTO: 0.02 X10*3/UL (ref 0–0.1)
BASOPHILS NFR BLD AUTO: 0.4 %
BILIRUB UR STRIP.AUTO-MCNC: NEGATIVE MG/DL
BNP SERPL-MCNC: 570 PG/ML (ref 0–99)
BODY SURFACE AREA: 1.5 M2
BUN SERPL-MCNC: 18 MG/DL (ref 6–23)
CALCIUM SERPL-MCNC: 9.2 MG/DL (ref 8.6–10.3)
CARDIAC TROPONIN I PNL SERPL HS: 20 NG/L (ref 0–13)
CHLORIDE SERPL-SCNC: 109 MMOL/L (ref 98–107)
CO2 SERPL-SCNC: 28 MMOL/L (ref 21–32)
COLOR UR: ABNORMAL
CREAT SERPL-MCNC: 0.83 MG/DL (ref 0.5–1.05)
EGFRCR SERPLBLD CKD-EPI 2021: 69 ML/MIN/1.73M*2
EJECTION FRACTION APICAL 4 CHAMBER: 45.5
EJECTION FRACTION: 44 %
EOSINOPHIL # BLD AUTO: 0.27 X10*3/UL (ref 0–0.4)
EOSINOPHIL NFR BLD AUTO: 5.4 %
ERYTHROCYTE [DISTWIDTH] IN BLOOD BY AUTOMATED COUNT: 15.7 % (ref 11.5–14.5)
EST. AVERAGE GLUCOSE BLD GHB EST-MCNC: 97 MG/DL
GLUCOSE SERPL-MCNC: 85 MG/DL (ref 74–99)
GLUCOSE UR STRIP.AUTO-MCNC: NORMAL MG/DL
HBA1C MFR BLD: 5 %
HCT VFR BLD AUTO: 30.3 % (ref 36–46)
HGB BLD-MCNC: 10.2 G/DL (ref 12–16)
HOLD SPECIMEN: NORMAL
IMM GRANULOCYTES # BLD AUTO: 0 X10*3/UL (ref 0–0.5)
IMM GRANULOCYTES NFR BLD AUTO: 0 % (ref 0–0.9)
KETONES UR STRIP.AUTO-MCNC: NEGATIVE MG/DL
LEFT VENTRICLE INTERNAL DIMENSION DIASTOLE: 3.96 CM (ref 3.5–6)
LEFT VENTRICULAR OUTFLOW TRACT DIAMETER: 1.97 CM
LEUKOCYTE ESTERASE UR QL STRIP.AUTO: ABNORMAL
LYMPHOCYTES # BLD AUTO: 2.45 X10*3/UL (ref 0.8–3)
LYMPHOCYTES NFR BLD AUTO: 49.4 %
MCH RBC QN AUTO: 26.4 PG (ref 26–34)
MCHC RBC AUTO-ENTMCNC: 33.7 G/DL (ref 32–36)
MCV RBC AUTO: 78 FL (ref 80–100)
MONOCYTES # BLD AUTO: 0.44 X10*3/UL (ref 0.05–0.8)
MONOCYTES NFR BLD AUTO: 8.9 %
MUCOUS THREADS #/AREA URNS AUTO: ABNORMAL /LPF
NEUTROPHILS # BLD AUTO: 1.78 X10*3/UL (ref 1.6–5.5)
NEUTROPHILS NFR BLD AUTO: 35.9 %
NITRITE UR QL STRIP.AUTO: NEGATIVE
NRBC BLD-RTO: 0 /100 WBCS (ref 0–0)
P AXIS: 76 DEGREES
P OFFSET: 168 MS
P ONSET: 129 MS
PH UR STRIP.AUTO: 5 [PH]
PLATELET # BLD AUTO: 176 X10*3/UL (ref 150–450)
POTASSIUM SERPL-SCNC: 4 MMOL/L (ref 3.5–5.3)
PR INTERVAL: 178 MS
PROT UR STRIP.AUTO-MCNC: NEGATIVE MG/DL
Q ONSET: 218 MS
QRS COUNT: 11 BEATS
QRS DURATION: 122 MS
QT INTERVAL: 450 MS
QTC CALCULATION(BAZETT): 482 MS
QTC FREDERICIA: 471 MS
R AXIS: -32 DEGREES
RBC # BLD AUTO: 3.87 X10*6/UL (ref 4–5.2)
RBC # UR STRIP.AUTO: NEGATIVE MG/DL
RBC #/AREA URNS AUTO: ABNORMAL /HPF
RIGHT VENTRICLE PEAK SYSTOLIC PRESSURE: 59.4 MMHG
SODIUM SERPL-SCNC: 141 MMOL/L (ref 136–145)
SP GR UR STRIP.AUTO: 1.02
SQUAMOUS #/AREA URNS AUTO: ABNORMAL /HPF
T AXIS: 122 DEGREES
T OFFSET: 443 MS
T4 FREE SERPL-MCNC: 1.11 NG/DL (ref 0.61–1.12)
TRICUSPID ANNULAR PLANE SYSTOLIC EXCURSION: 1.2 CM
TSH SERPL-ACNC: 2.66 MIU/L (ref 0.44–3.98)
UROBILINOGEN UR STRIP.AUTO-MCNC: NORMAL MG/DL
VENTRICULAR RATE: 69 BPM
WBC # BLD AUTO: 5 X10*3/UL (ref 4.4–11.3)
WBC #/AREA URNS AUTO: ABNORMAL /HPF

## 2025-04-10 PROCEDURE — 1210000001 HC SEMI-PRIVATE ROOM DAILY

## 2025-04-10 PROCEDURE — 93005 ELECTROCARDIOGRAM TRACING: CPT

## 2025-04-10 PROCEDURE — 84443 ASSAY THYROID STIM HORMONE: CPT | Performed by: INTERNAL MEDICINE

## 2025-04-10 PROCEDURE — 93308 TTE F-UP OR LMTD: CPT | Performed by: INTERNAL MEDICINE

## 2025-04-10 PROCEDURE — 93321 DOPPLER ECHO F-UP/LMTD STD: CPT | Performed by: INTERNAL MEDICINE

## 2025-04-10 PROCEDURE — 93325 DOPPLER ECHO COLOR FLOW MAPG: CPT

## 2025-04-10 PROCEDURE — 70551 MRI BRAIN STEM W/O DYE: CPT | Performed by: RADIOLOGY

## 2025-04-10 PROCEDURE — 93246 EXT ECG>7D<15D RECORDING: CPT

## 2025-04-10 PROCEDURE — 81001 URINALYSIS AUTO W/SCOPE: CPT | Performed by: EMERGENCY MEDICINE

## 2025-04-10 PROCEDURE — 93325 DOPPLER ECHO COLOR FLOW MAPG: CPT | Performed by: INTERNAL MEDICINE

## 2025-04-10 PROCEDURE — 93880 EXTRACRANIAL BILAT STUDY: CPT | Performed by: SURGERY

## 2025-04-10 PROCEDURE — 70551 MRI BRAIN STEM W/O DYE: CPT

## 2025-04-10 PROCEDURE — 87086 URINE CULTURE/COLONY COUNT: CPT | Mod: AHULAB | Performed by: EMERGENCY MEDICINE

## 2025-04-10 PROCEDURE — 80048 BASIC METABOLIC PNL TOTAL CA: CPT

## 2025-04-10 PROCEDURE — 36415 COLL VENOUS BLD VENIPUNCTURE: CPT

## 2025-04-10 PROCEDURE — RXMED WILLOW AMBULATORY MEDICATION CHARGE

## 2025-04-10 PROCEDURE — 2500000004 HC RX 250 GENERAL PHARMACY W/ HCPCS (ALT 636 FOR OP/ED)

## 2025-04-10 PROCEDURE — 99223 1ST HOSP IP/OBS HIGH 75: CPT | Performed by: PSYCHIATRY & NEUROLOGY

## 2025-04-10 PROCEDURE — 93010 ELECTROCARDIOGRAM REPORT: CPT | Performed by: INTERNAL MEDICINE

## 2025-04-10 PROCEDURE — 2500000001 HC RX 250 WO HCPCS SELF ADMINISTERED DRUGS (ALT 637 FOR MEDICARE OP)

## 2025-04-10 PROCEDURE — 85025 COMPLETE CBC W/AUTO DIFF WBC: CPT

## 2025-04-10 PROCEDURE — 99238 HOSP IP/OBS DSCHRG MGMT 30/<: CPT

## 2025-04-10 PROCEDURE — 84484 ASSAY OF TROPONIN QUANT: CPT

## 2025-04-10 PROCEDURE — 96372 THER/PROPH/DIAG INJ SC/IM: CPT

## 2025-04-10 PROCEDURE — 93880 EXTRACRANIAL BILAT STUDY: CPT

## 2025-04-10 PROCEDURE — 2500000004 HC RX 250 GENERAL PHARMACY W/ HCPCS (ALT 636 FOR OP/ED): Performed by: INTERNAL MEDICINE

## 2025-04-10 PROCEDURE — 84439 ASSAY OF FREE THYROXINE: CPT

## 2025-04-10 RX ORDER — CLOPIDOGREL BISULFATE 75 MG/1
75 TABLET ORAL DAILY
Qty: 30 TABLET | Refills: 0 | Status: SHIPPED | OUTPATIENT
Start: 2025-04-10 | End: 2025-05-10

## 2025-04-10 RX ORDER — CLOPIDOGREL BISULFATE 75 MG/1
75 TABLET ORAL DAILY
Status: DISCONTINUED | OUTPATIENT
Start: 2025-04-10 | End: 2025-04-10 | Stop reason: HOSPADM

## 2025-04-10 RX ORDER — CYCLOSPORINE 0.5 MG/ML
1 EMULSION OPHTHALMIC 2 TIMES DAILY
Status: DISCONTINUED | OUTPATIENT
Start: 2025-04-10 | End: 2025-04-10 | Stop reason: HOSPADM

## 2025-04-10 RX ORDER — ATORVASTATIN CALCIUM 40 MG/1
80 TABLET, FILM COATED ORAL NIGHTLY
Status: DISCONTINUED | OUTPATIENT
Start: 2025-04-10 | End: 2025-04-10 | Stop reason: HOSPADM

## 2025-04-10 RX ORDER — EZETIMIBE 10 MG/1
10 TABLET ORAL NIGHTLY
Status: DISCONTINUED | OUTPATIENT
Start: 2025-04-10 | End: 2025-04-10 | Stop reason: HOSPADM

## 2025-04-10 RX ORDER — METOPROLOL TARTRATE 25 MG/1
12.5 TABLET, FILM COATED ORAL 2 TIMES DAILY
Status: DISCONTINUED | OUTPATIENT
Start: 2025-04-10 | End: 2025-04-10 | Stop reason: HOSPADM

## 2025-04-10 RX ORDER — HYDROCHLOROTHIAZIDE 25 MG/1
25 TABLET ORAL DAILY
Status: DISCONTINUED | OUTPATIENT
Start: 2025-04-10 | End: 2025-04-10 | Stop reason: HOSPADM

## 2025-04-10 RX ORDER — NAPROXEN SODIUM 220 MG/1
81 TABLET, FILM COATED ORAL DAILY
Status: DISCONTINUED | OUTPATIENT
Start: 2025-04-10 | End: 2025-04-10 | Stop reason: HOSPADM

## 2025-04-10 RX ADMIN — CYCLOSPORINE 1 DROP: 0.5 EMULSION OPHTHALMIC at 08:52

## 2025-04-10 RX ADMIN — POLYETHYLENE GLYCOL 3350 17 G: 17 POWDER, FOR SOLUTION ORAL at 08:52

## 2025-04-10 RX ADMIN — HYDROCHLOROTHIAZIDE 25 MG: 25 TABLET ORAL at 08:51

## 2025-04-10 RX ADMIN — PANTOPRAZOLE SODIUM 40 MG: 40 TABLET, DELAYED RELEASE ORAL at 05:57

## 2025-04-10 RX ADMIN — ASPIRIN 81 MG CHEWABLE TABLET 81 MG: 81 TABLET CHEWABLE at 08:50

## 2025-04-10 RX ADMIN — METOPROLOL TARTRATE 12.5 MG: 25 TABLET, FILM COATED ORAL at 08:50

## 2025-04-10 RX ADMIN — CLOPIDOGREL 75 MG: 75 TABLET ORAL at 08:50

## 2025-04-10 RX ADMIN — DOCUSATE SODIUM 100 MG: 100 CAPSULE, LIQUID FILLED ORAL at 08:50

## 2025-04-10 RX ADMIN — ENOXAPARIN SODIUM 40 MG: 40 INJECTION SUBCUTANEOUS at 08:50

## 2025-04-10 RX ADMIN — PERFLUTREN 2 ML OF DILUTION: 6.52 INJECTION, SUSPENSION INTRAVENOUS at 14:56

## 2025-04-10 SDOH — ECONOMIC STABILITY: FOOD INSECURITY: WITHIN THE PAST 12 MONTHS, YOU WORRIED THAT YOUR FOOD WOULD RUN OUT BEFORE YOU GOT THE MONEY TO BUY MORE.: NEVER TRUE

## 2025-04-10 SDOH — SOCIAL STABILITY: SOCIAL INSECURITY: WITHIN THE LAST YEAR, HAVE YOU BEEN HUMILIATED OR EMOTIONALLY ABUSED IN OTHER WAYS BY YOUR PARTNER OR EX-PARTNER?: NO

## 2025-04-10 SDOH — SOCIAL STABILITY: SOCIAL INSECURITY
WITHIN THE LAST YEAR, HAVE YOU BEEN KICKED, HIT, SLAPPED, OR OTHERWISE PHYSICALLY HURT BY YOUR PARTNER OR EX-PARTNER?: NO

## 2025-04-10 SDOH — SOCIAL STABILITY: SOCIAL INSECURITY
WITHIN THE LAST YEAR, HAVE YOU BEEN RAPED OR FORCED TO HAVE ANY KIND OF SEXUAL ACTIVITY BY YOUR PARTNER OR EX-PARTNER?: NO

## 2025-04-10 SDOH — SOCIAL STABILITY: SOCIAL INSECURITY: DO YOU FEEL ANYONE HAS EXPLOITED OR TAKEN ADVANTAGE OF YOU FINANCIALLY OR OF YOUR PERSONAL PROPERTY?: NO

## 2025-04-10 SDOH — HEALTH STABILITY: MENTAL HEALTH: HOW OFTEN DO YOU HAVE SIX OR MORE DRINKS ON ONE OCCASION?: NEVER

## 2025-04-10 SDOH — ECONOMIC STABILITY: HOUSING INSECURITY: IN THE LAST 12 MONTHS, WAS THERE A TIME WHEN YOU WERE NOT ABLE TO PAY THE MORTGAGE OR RENT ON TIME?: NO

## 2025-04-10 SDOH — SOCIAL STABILITY: SOCIAL INSECURITY: WERE YOU ABLE TO COMPLETE ALL THE BEHAVIORAL HEALTH SCREENINGS?: YES

## 2025-04-10 SDOH — ECONOMIC STABILITY: HOUSING INSECURITY: AT ANY TIME IN THE PAST 12 MONTHS, WERE YOU HOMELESS OR LIVING IN A SHELTER (INCLUDING NOW)?: NO

## 2025-04-10 SDOH — SOCIAL STABILITY: SOCIAL NETWORK: HOW OFTEN DO YOU GET TOGETHER WITH FRIENDS OR RELATIVES?: MORE THAN THREE TIMES A WEEK

## 2025-04-10 SDOH — HEALTH STABILITY: MENTAL HEALTH: HOW OFTEN DO YOU HAVE A DRINK CONTAINING ALCOHOL?: NEVER

## 2025-04-10 SDOH — SOCIAL STABILITY: SOCIAL NETWORK
IN A TYPICAL WEEK, HOW MANY TIMES DO YOU TALK ON THE PHONE WITH FAMILY, FRIENDS, OR NEIGHBORS?: MORE THAN THREE TIMES A WEEK

## 2025-04-10 SDOH — SOCIAL STABILITY: SOCIAL INSECURITY: HAVE YOU HAD THOUGHTS OF HARMING ANYONE ELSE?: NO

## 2025-04-10 SDOH — HEALTH STABILITY: MENTAL HEALTH
DO YOU FEEL STRESS - TENSE, RESTLESS, NERVOUS, OR ANXIOUS, OR UNABLE TO SLEEP AT NIGHT BECAUSE YOUR MIND IS TROUBLED ALL THE TIME - THESE DAYS?: NOT AT ALL

## 2025-04-10 SDOH — ECONOMIC STABILITY: FOOD INSECURITY: WITHIN THE PAST 12 MONTHS, THE FOOD YOU BOUGHT JUST DIDN'T LAST AND YOU DIDN'T HAVE MONEY TO GET MORE.: NEVER TRUE

## 2025-04-10 SDOH — ECONOMIC STABILITY: INCOME INSECURITY: IN THE PAST 12 MONTHS HAS THE ELECTRIC, GAS, OIL, OR WATER COMPANY THREATENED TO SHUT OFF SERVICES IN YOUR HOME?: NO

## 2025-04-10 SDOH — HEALTH STABILITY: PHYSICAL HEALTH: ON AVERAGE, HOW MANY DAYS PER WEEK DO YOU ENGAGE IN MODERATE TO STRENUOUS EXERCISE (LIKE A BRISK WALK)?: 0 DAYS

## 2025-04-10 SDOH — SOCIAL STABILITY: SOCIAL INSECURITY: WITHIN THE LAST YEAR, HAVE YOU BEEN AFRAID OF YOUR PARTNER OR EX-PARTNER?: NO

## 2025-04-10 SDOH — ECONOMIC STABILITY: TRANSPORTATION INSECURITY: IN THE PAST 12 MONTHS, HAS LACK OF TRANSPORTATION KEPT YOU FROM MEDICAL APPOINTMENTS OR FROM GETTING MEDICATIONS?: NO

## 2025-04-10 SDOH — HEALTH STABILITY: MENTAL HEALTH: HOW MANY DRINKS CONTAINING ALCOHOL DO YOU HAVE ON A TYPICAL DAY WHEN YOU ARE DRINKING?: PATIENT DOES NOT DRINK

## 2025-04-10 SDOH — ECONOMIC STABILITY: HOUSING INSECURITY: IN THE PAST 12 MONTHS, HOW MANY TIMES HAVE YOU MOVED WHERE YOU WERE LIVING?: 1

## 2025-04-10 SDOH — SOCIAL STABILITY: SOCIAL INSECURITY: ARE YOU MARRIED, WIDOWED, DIVORCED, SEPARATED, NEVER MARRIED, OR LIVING WITH A PARTNER?: WIDOWED

## 2025-04-10 SDOH — SOCIAL STABILITY: SOCIAL INSECURITY: HAS ANYONE EVER THREATENED TO HURT YOUR FAMILY OR YOUR PETS?: NO

## 2025-04-10 SDOH — HEALTH STABILITY: PHYSICAL HEALTH
HOW OFTEN DO YOU NEED TO HAVE SOMEONE HELP YOU WHEN YOU READ INSTRUCTIONS, PAMPHLETS, OR OTHER WRITTEN MATERIAL FROM YOUR DOCTOR OR PHARMACY?: NEVER

## 2025-04-10 SDOH — HEALTH STABILITY: PHYSICAL HEALTH: ON AVERAGE, HOW MANY MINUTES DO YOU ENGAGE IN EXERCISE AT THIS LEVEL?: 0 MIN

## 2025-04-10 SDOH — ECONOMIC STABILITY: FOOD INSECURITY: HOW HARD IS IT FOR YOU TO PAY FOR THE VERY BASICS LIKE FOOD, HOUSING, MEDICAL CARE, AND HEATING?: NOT HARD AT ALL

## 2025-04-10 SDOH — SOCIAL STABILITY: SOCIAL INSECURITY: ABUSE: ADULT

## 2025-04-10 SDOH — SOCIAL STABILITY: SOCIAL INSECURITY: ARE THERE ANY APPARENT SIGNS OF INJURIES/BEHAVIORS THAT COULD BE RELATED TO ABUSE/NEGLECT?: NO

## 2025-04-10 SDOH — SOCIAL STABILITY: SOCIAL INSECURITY: DOES ANYONE TRY TO KEEP YOU FROM HAVING/CONTACTING OTHER FRIENDS OR DOING THINGS OUTSIDE YOUR HOME?: NO

## 2025-04-10 SDOH — SOCIAL STABILITY: SOCIAL INSECURITY: DO YOU FEEL UNSAFE GOING BACK TO THE PLACE WHERE YOU ARE LIVING?: NO

## 2025-04-10 SDOH — SOCIAL STABILITY: SOCIAL INSECURITY: HAVE YOU HAD ANY THOUGHTS OF HARMING ANYONE ELSE?: NO

## 2025-04-10 SDOH — SOCIAL STABILITY: SOCIAL INSECURITY: ARE YOU OR HAVE YOU BEEN THREATENED OR ABUSED PHYSICALLY, EMOTIONALLY, OR SEXUALLY BY ANYONE?: NO

## 2025-04-10 ASSESSMENT — LIFESTYLE VARIABLES
AUDIT-C TOTAL SCORE: 0
HOW MANY STANDARD DRINKS CONTAINING ALCOHOL DO YOU HAVE ON A TYPICAL DAY: PATIENT DOES NOT DRINK
AUDIT-C TOTAL SCORE: 0
HOW OFTEN DO YOU HAVE 6 OR MORE DRINKS ON ONE OCCASION: NEVER
HOW OFTEN DO YOU HAVE A DRINK CONTAINING ALCOHOL: NEVER
SKIP TO QUESTIONS 9-10: 1
SUBSTANCE_ABUSE_PAST_12_MONTHS: NO
PRESCIPTION_ABUSE_PAST_12_MONTHS: NO
AUDIT-C TOTAL SCORE: 0
SKIP TO QUESTIONS 9-10: 1

## 2025-04-10 ASSESSMENT — ENCOUNTER SYMPTOMS
PSYCHIATRIC NEGATIVE: 1
SPEECH DIFFICULTY: 1
RESPIRATORY NEGATIVE: 1
MUSCULOSKELETAL NEGATIVE: 1
GASTROINTESTINAL NEGATIVE: 1
WEAKNESS: 1
HEMATOLOGIC/LYMPHATIC NEGATIVE: 1
ENDOCRINE NEGATIVE: 1
CONSTITUTIONAL NEGATIVE: 1
ALLERGIC/IMMUNOLOGIC NEGATIVE: 1
EYES NEGATIVE: 1
CARDIOVASCULAR NEGATIVE: 1

## 2025-04-10 ASSESSMENT — PATIENT HEALTH QUESTIONNAIRE - PHQ9
1. LITTLE INTEREST OR PLEASURE IN DOING THINGS: NOT AT ALL
2. FEELING DOWN, DEPRESSED OR HOPELESS: NOT AT ALL
SUM OF ALL RESPONSES TO PHQ9 QUESTIONS 1 & 2: 0

## 2025-04-10 ASSESSMENT — ACTIVITIES OF DAILY LIVING (ADL)
JUDGMENT_ADEQUATE_SAFELY_COMPLETE_DAILY_ACTIVITIES: YES
PATIENT'S MEMORY ADEQUATE TO SAFELY COMPLETE DAILY ACTIVITIES?: YES
TOILETING: INDEPENDENT
WALKS IN HOME: INDEPENDENT
FEEDING YOURSELF: INDEPENDENT
LACK_OF_TRANSPORTATION: NO
DRESSING YOURSELF: INDEPENDENT
BATHING: INDEPENDENT
HEARING - RIGHT EAR: FUNCTIONAL
HEARING - LEFT EAR: FUNCTIONAL
GROOMING: INDEPENDENT
ADEQUATE_TO_COMPLETE_ADL: YES

## 2025-04-10 ASSESSMENT — PAIN SCALES - GENERAL
PAINLEVEL_OUTOF10: 0 - NO PAIN

## 2025-04-10 ASSESSMENT — COGNITIVE AND FUNCTIONAL STATUS - GENERAL
WALKING IN HOSPITAL ROOM: A LITTLE
CLIMB 3 TO 5 STEPS WITH RAILING: A LITTLE
DAILY ACTIVITIY SCORE: 23
TOILETING: A LITTLE
MOBILITY SCORE: 22

## 2025-04-10 ASSESSMENT — PAIN - FUNCTIONAL ASSESSMENT
PAIN_FUNCTIONAL_ASSESSMENT: 0-10

## 2025-04-10 NOTE — PROGRESS NOTES
Zabrina Syed is a 84 y.o. female on day 0 of admission presenting with TIA (transient ischemic attack).      Subjective   Seen and examined patient at bedside this morning  Reports no new complaints  Some continued tingling in right hand, says this is ongoing from carpal tunnel  Pharmacy verified with patient, she has not been taking plavix daily.     Objective     Last Recorded Vitals  /50   Pulse 71   Temp 36.4 °C (97.6 °F) (Oral)   Resp 18   Wt 51.7 kg (113 lb 15.7 oz)   SpO2 98%   Intake/Output last 3 Shifts:    Intake/Output Summary (Last 24 hours) at 4/10/2025 0921  Last data filed at 4/10/2025 0600  Gross per 24 hour   Intake 150 ml   Output 400 ml   Net -250 ml       Admission Weight  Weight: 51.7 kg (114 lb) (04/09/25 2029)    Daily Weight  04/10/25 : 51.7 kg (113 lb 15.7 oz)    Image Results  MR brain wo IV contrast  Narrative: Interpreted By:  Alix Betancourt,   STUDY:  MR BRAIN WO IV CONTRAST;  4/10/2025 8:06 am      INDICATION:  Signs/Symptoms:stroke like symptoms.          COMPARISON:  Head CT April 9, 2025      ACCESSION NUMBER(S):  [id number]      ORDERING CLINICIAN:  NGUYEN JUARES      TECHNIQUE:  Axial T2, FLAIR, DWI, gradient echo T2 and sagittal and coronal T1  weighted images of brain were acquired.      FINDINGS:  CSF Spaces: There is prominence of ventricles and sulci compatible  with diffuse parenchymal volume loss. The degree of ventriculomegaly  is somewhat disproportionate with the size of the sulci.      Parenchyma: There is subtle asymmetric curvilinear hyperintensity on  diffusion-imaging along the cortical surface of the right frontal  lobe involving the operculum and extending into the anterior aspect  of the insula. There are additional small foci of increased signal on  diffusion imaging in the posterior left frontal lobe and left  parietal lobe. There are patchy and confluent hyperintensities on  FLAIR and T2 weighted imaging in the subcortical and  periventricular  white matter. There is involvement of the mark. There is no mass  effect or midline shift.      Paranasal Sinuses and Mastoids: There is trace mucosal thickening  within scattered paranasal sinuses. The mastoid air cells are clear.      The sagittal T1 weighted images demonstrate marked degenerative  changes of the visualized portion of the cervical spine with central  canal stenosis due to posterior disc/osteophyte complex formation,  suboptimally assessed on the basis of this examination.      Impression: 1. Scattered small foci of suspected acute to early subacute ischemic  injury involving bilateral MCA distributions raising suspicion for an  embolic process.  2. Nonspecific white matter changes most likely represent  small-vessel ischemic disease in a patient of this age and also  involve the mark.  3. Diffuse parenchymal volume loss.          MACRO:  Critical Finding:  Acute infarct. Notification was initiated on  4/10/2025 at 8:26 am by  Alix Betancourt.  (**-OCF-**)      Signed by: Alix Betancourt 4/10/2025 8:26 AM  Dictation workstation:   [id number]      Physical Exam  Constitutional: NAD, pt alert and cooperative  Eyes: no icterus  ENMT: mucous membranes moist, no lesions seen  Head/Neck: Neck supple  Respiratory/Thorax: CTA bilaterally, non-labored breathing, no cough, on RA  Cardiovascular: Regular rate and rhythm, no murmurs heard  Gastrointestinal: Nondistended, soft, non-tender, BS present x 4  : no Green, no SP discomfort  Musculoskeletal: ROM intact, no joint swelling  Extremities: normal extremities, no edema  Neurological: A&O x 3, speech clear, follows commands appropriately, cr. n. grossly intact, sensation grossly intact, motor 5/5 throughout  Skin: Warm and dry, no lesions, no rashes  Psych: calm, stable mood    Relevant Results  Scheduled medications  aspirin, 81 mg, oral, Daily  atorvastatin, 80 mg, oral, Nightly  clopidogrel, 75 mg, oral, Daily  cycloSPORINE, 1 drop,  Both Eyes, BID  docusate sodium, 100 mg, oral, BID  enoxaparin, 40 mg, subcutaneous, q24h  ezetimibe, 10 mg, oral, Nightly  hydroCHLOROthiazide, 25 mg, oral, Daily  metoprolol tartrate, 12.5 mg, oral, BID  pantoprazole, 40 mg, oral, Daily before breakfast   Or  pantoprazole, 40 mg, intravenous, Daily before breakfast  polyethylene glycol, 17 g, oral, Daily      Continuous medications     PRN medications  PRN medications: acetaminophen **OR** acetaminophen **OR** acetaminophen, labetaloL, oxygen  Results for orders placed or performed during the hospital encounter of 04/09/25 (from the past 24 hours)   POCT GLUCOSE   Result Value Ref Range    POCT Glucose 124 (H) 74 - 99 mg/dL   CBC and Auto Differential   Result Value Ref Range    WBC 6.1 4.4 - 11.3 x10*3/uL    nRBC 0.0 0.0 - 0.0 /100 WBCs    RBC 3.98 (L) 4.00 - 5.20 x10*6/uL    Hemoglobin 10.7 (L) 12.0 - 16.0 g/dL    Hematocrit 31.4 (L) 36.0 - 46.0 %    MCV 79 (L) 80 - 100 fL    MCH 26.9 26.0 - 34.0 pg    MCHC 34.1 32.0 - 36.0 g/dL    RDW 15.8 (H) 11.5 - 14.5 %    Platelets 186 150 - 450 x10*3/uL    Neutrophils % 47.1 40.0 - 80.0 %    Immature Granulocytes %, Automated 0.2 0.0 - 0.9 %    Lymphocytes % 39.3 13.0 - 44.0 %    Monocytes % 8.4 2.0 - 10.0 %    Eosinophils % 4.8 0.0 - 6.0 %    Basophils % 0.2 0.0 - 2.0 %    Neutrophils Absolute 2.86 1.60 - 5.50 x10*3/uL    Immature Granulocytes Absolute, Automated 0.01 0.00 - 0.50 x10*3/uL    Lymphocytes Absolute 2.38 0.80 - 3.00 x10*3/uL    Monocytes Absolute 0.51 0.05 - 0.80 x10*3/uL    Eosinophils Absolute 0.29 0.00 - 0.40 x10*3/uL    Basophils Absolute 0.01 0.00 - 0.10 x10*3/uL   Magnesium   Result Value Ref Range    Magnesium 1.73 1.60 - 2.40 mg/dL   Comprehensive metabolic panel   Result Value Ref Range    Glucose 124 (H) 74 - 99 mg/dL    Sodium 142 136 - 145 mmol/L    Potassium 3.9 3.5 - 5.3 mmol/L    Chloride 109 (H) 98 - 107 mmol/L    Bicarbonate 27 21 - 32 mmol/L    Anion Gap 10 10 - 20 mmol/L    Urea Nitrogen  22 6 - 23 mg/dL    Creatinine 0.83 0.50 - 1.05 mg/dL    eGFR 69 >60 mL/min/1.73m*2    Calcium 9.4 8.6 - 10.3 mg/dL    Albumin 3.5 3.4 - 5.0 g/dL    Alkaline Phosphatase 85 33 - 136 U/L    Total Protein 6.6 6.4 - 8.2 g/dL    AST 37 9 - 39 U/L    Bilirubin, Total 0.4 0.0 - 1.2 mg/dL    ALT 25 7 - 45 U/L   Troponin I, High Sensitivity   Result Value Ref Range    Troponin I, High Sensitivity 22 (H) 0 - 13 ng/L   TSH with reflex to Free T4 if abnormal   Result Value Ref Range    Thyroid Stimulating Hormone <37.50 (H) 0.44 - 3.98 mIU/L   Coagulation Screen   Result Value Ref Range    Protime 13.1 (H) 9.8 - 12.4 seconds    INR 1.2 (H) 0.9 - 1.1    aPTT 30 26 - 36 seconds   Thyroxine, Free   Result Value Ref Range    Thyroxine, Free 1.05 0.61 - 1.12 ng/dL   Lipid Panel   Result Value Ref Range    Cholesterol 138 0 - 199 mg/dL    HDL-Cholesterol 53.4 mg/dL    Cholesterol/HDL Ratio 2.6     LDL Calculated 75 <=99 mg/dL    VLDL 10 0 - 40 mg/dL    Triglycerides 49 0 - 149 mg/dL    Non HDL Cholesterol 85 0 - 149 mg/dL   B-Type Natriuretic Peptide   Result Value Ref Range     (H) 0 - 99 pg/mL   Urinalysis with Reflex Culture and Microscopic   Result Value Ref Range    Color, Urine Light-Yellow Light-Yellow, Yellow, Dark-Yellow    Appearance, Urine Clear Clear    Specific Gravity, Urine 1.021 1.005 - 1.035    pH, Urine 5.0 5.0, 5.5, 6.0, 6.5, 7.0, 7.5, 8.0    Protein, Urine NEGATIVE NEGATIVE, 10 (TRACE), 20 (TRACE) mg/dL    Glucose, Urine Normal Normal mg/dL    Blood, Urine NEGATIVE NEGATIVE mg/dL    Ketones, Urine NEGATIVE NEGATIVE mg/dL    Bilirubin, Urine NEGATIVE NEGATIVE mg/dL    Urobilinogen, Urine Normal Normal mg/dL    Nitrite, Urine NEGATIVE NEGATIVE    Leukocyte Esterase, Urine 500 Stan/uL (A) NEGATIVE   Microscopic Only, Urine   Result Value Ref Range    WBC, Urine 21-50 (A) 1-5, NONE /HPF    RBC, Urine 3-5 NONE, 1-2, 3-5 /HPF    Squamous Epithelial Cells, Urine 1-9 (SPARSE) Reference range not established. /HPF     Bacteria, Urine 2+ (A) NONE SEEN /HPF    Mucus, Urine FEW Reference range not established. /LPF   Basic Metabolic Panel   Result Value Ref Range    Glucose 85 74 - 99 mg/dL    Sodium 141 136 - 145 mmol/L    Potassium 4.0 3.5 - 5.3 mmol/L    Chloride 109 (H) 98 - 107 mmol/L    Bicarbonate 28 21 - 32 mmol/L    Anion Gap 8 (L) 10 - 20 mmol/L    Urea Nitrogen 18 6 - 23 mg/dL    Creatinine 0.83 0.50 - 1.05 mg/dL    eGFR 69 >60 mL/min/1.73m*2    Calcium 9.2 8.6 - 10.3 mg/dL   CBC and Auto Differential   Result Value Ref Range    WBC 5.0 4.4 - 11.3 x10*3/uL    nRBC 0.0 0.0 - 0.0 /100 WBCs    RBC 3.87 (L) 4.00 - 5.20 x10*6/uL    Hemoglobin 10.2 (L) 12.0 - 16.0 g/dL    Hematocrit 30.3 (L) 36.0 - 46.0 %    MCV 78 (L) 80 - 100 fL    MCH 26.4 26.0 - 34.0 pg    MCHC 33.7 32.0 - 36.0 g/dL    RDW 15.7 (H) 11.5 - 14.5 %    Platelets 176 150 - 450 x10*3/uL    Neutrophils % 35.9 40.0 - 80.0 %    Immature Granulocytes %, Automated 0.0 0.0 - 0.9 %    Lymphocytes % 49.4 13.0 - 44.0 %    Monocytes % 8.9 2.0 - 10.0 %    Eosinophils % 5.4 0.0 - 6.0 %    Basophils % 0.4 0.0 - 2.0 %    Neutrophils Absolute 1.78 1.60 - 5.50 x10*3/uL    Immature Granulocytes Absolute, Automated 0.00 0.00 - 0.50 x10*3/uL    Lymphocytes Absolute 2.45 0.80 - 3.00 x10*3/uL    Monocytes Absolute 0.44 0.05 - 0.80 x10*3/uL    Eosinophils Absolute 0.27 0.00 - 0.40 x10*3/uL    Basophils Absolute 0.02 0.00 - 0.10 x10*3/uL   T4, free   Result Value Ref Range    Thyroxine, Free 1.11 0.61 - 1.12 ng/dL   Troponin I, High Sensitivity   Result Value Ref Range    Troponin I, High Sensitivity 20 (H) 0 - 13 ng/L     MR brain wo IV contrast    Result Date: 4/10/2025  Interpreted By:  Alix Betancourt, STUDY: MR BRAIN WO IV CONTRAST;  4/10/2025 8:06 am   INDICATION: Signs/Symptoms:stroke like symptoms.     COMPARISON: Head CT April 9, 2025   ACCESSION NUMBER(S): [id number]   ORDERING CLINICIAN: NUGYEN JUARES   TECHNIQUE: Axial T2, FLAIR, DWI, gradient echo T2 and sagittal and  coronal T1 weighted images of brain were acquired.   FINDINGS: CSF Spaces: There is prominence of ventricles and sulci compatible with diffuse parenchymal volume loss. The degree of ventriculomegaly is somewhat disproportionate with the size of the sulci.   Parenchyma: There is subtle asymmetric curvilinear hyperintensity on diffusion-imaging along the cortical surface of the right frontal lobe involving the operculum and extending into the anterior aspect of the insula. There are additional small foci of increased signal on diffusion imaging in the posterior left frontal lobe and left parietal lobe. There are patchy and confluent hyperintensities on FLAIR and T2 weighted imaging in the subcortical and periventricular white matter. There is involvement of the mark. There is no mass effect or midline shift.   Paranasal Sinuses and Mastoids: There is trace mucosal thickening within scattered paranasal sinuses. The mastoid air cells are clear.   The sagittal T1 weighted images demonstrate marked degenerative changes of the visualized portion of the cervical spine with central canal stenosis due to posterior disc/osteophyte complex formation, suboptimally assessed on the basis of this examination.       1. Scattered small foci of suspected acute to early subacute ischemic injury involving bilateral MCA distributions raising suspicion for an embolic process. 2. Nonspecific white matter changes most likely represent small-vessel ischemic disease in a patient of this age and also involve the mark. 3. Diffuse parenchymal volume loss.     MACRO: Critical Finding:  Acute infarct. Notification was initiated on 4/10/2025 at 8:26 am by  Alix Betancourt.  (**-OCF-**)   Signed by: Alix Betancourt 4/10/2025 8:26 AM Dictation workstation:   [id number]    CT brain attack head wo IV contrast    Result Date: 4/9/2025  Interpreted By:  Filemon Renee, STUDY: CT BRAIN ATTACK HEAD WO IV CONTRAST;  4/9/2025 9:04 pm   INDICATION:  Signs/Symptoms:acute R arm paresthesia, slurred speech.   COMPARISON: None.   ACCESSION NUMBER(S): [id number]   ORDERING CLINICIAN: STEPAN GOODWIN   TECHNIQUE: Axial noncontrast CT images of the head. Sagittal and coronal reformats were provided.   FINDINGS: BRAIN: No acute intracranial hemorrhage. No mass effect or midline shift. Gray-white matter interfaces are preserved.Patchy white matter hypodensities which are nonspecific but likely related to microangiopathic white matter changes.   VENTRICLES and EXTRA-AXIAL SPACES: Prominent ventricles and extra-axial CSF spaces, reflecting parenchymal volume loss.   EXTRACRANIAL SOFT TISSUES: Within normal limits.   PARANASAL SINUSES/MASTOIDS: The visualized paranasal sinuses and mastoid air cells are aerated.   BONES AND ORBITS: No displaced skull fracture. Orbits are within normal limits.   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       1. No acute intracranial hemorrhage or mass effect. 2. White matter hypoattenuation, ossific, however likely sequelae of chronic microvascular ischemic change.   MACRO: Filemon Renee discussed the significance and urgency of this critical finding by telephone with  STEPAN GOODWIN on 4/9/2025 at 9:10 pm.  (**-RCF-**) Findings:  See findings.   Signed by: Filemon Renee 4/9/2025 9:11 PM Dictation workstation:   [id number]     Assessment/Plan   Assessment & Plan  TIA (transient ischemic attack)    Zabrina Syed is a 84 y.o. female with a past medical history of  hypertension, hyperlipidemia, coronary disease, aortic valve disease, arthritis, carpal tunnel and history of remote shoulder surgery ( ~10 years ago) who presents to [facility] ED for complaints of slurred speech and RUE weakness and numbness. Patient was struggling to open a meat jar when after opening it her right arm became weak and limp. She was unable to lift arm or move fingers. She called her son on the  phone ( who felt that she was having slurred speech as well) suggested that she call 911.  Upon arrival to ED, her right arm weakness, and limpness had resolved. She still has residual tingling in her hand ( which she states is baseline, as she had carpal tunnel). Denies any headache, blurred vision, difficulty speaking. Denies any chest pain, shortness of breath, difficulty breathing.      CVA  - Presented with slurred speech, RUE weakness and numbness   - CTH negative  - MRI Brain : Scattered small foci of suspected acute to early subacute ischemic  injury involving bilateral MCA distributions raising suspicion for an embolic process. Nonspecific white matter changes most likely represent small-vessel ischemic disease in a patient of this age and also involve the mark. Diffuse parenchymal volume loss.   - ECHO pending  - Continuous cardiac monitoring  - ECG pending  - Continue 81 mg aspirin, START 75 mg plavix, and 80 mg atorvastatin  - TSH <37.50, Free T4 pending     HTN  - stable; continue home regimen     HLD  -stable, continue home regimen     GI/VTE PPX: PPI, SQ lovenox  Code Status: Full Code     Chart, medical history, and labs/testing reviewed in detail.   Case and plan of care discussed with attending provider, Dr Ogden      Disposition: Discharge home once medically cleared and stable, pending work up/ ECHO  - no indications for PT/OT  - OP neurology follow up     KRISTIE Olea-CNP

## 2025-04-10 NOTE — CARE PLAN
The patient's goals for the shift include      The clinical goals for the shift include safety    Over the shift, the patient did not make progress toward the following goals. Barriers to progression include . Recommendations to address these barriers include   Problem: Pain - Adult  Goal: Verbalizes/displays adequate comfort level or baseline comfort level  Outcome: Progressing   .

## 2025-04-10 NOTE — CONSULTS
"Consult to Neurology   Referred by: BREE Ogden, PCP: Corinna Baer MD  Consult for TIA/stroke.     Presented with acute onset RUE sensorimotor deficit and dysarthria that had largely resolved on arrival to ER.  Denies any prior or similar events, no associated sx of chest pain, palpitations.  Lives with son/ stroke survivor and wife who encouraged her to come to ER.   CT- microvascular changes, volume loss.  MRI- DWI- small acute infarct R frontal gyrus with a few dots on the L.  Microvascular changes confirmed.   Labs mildly elevated troponin.   TTE in February- EF 45% with LVH, severely dilated LA  Vascular risk factors- HTN, HPL- LDL 75 (down from >130s), CAD.  No DM     Pre-admit/ Baseline modified Sameer Score 0     Past Medical History    Surgical History  Past Surgical History:   Procedure Laterality Date    APPENDECTOMY  12/02/2014    Appendectomy    CARDIAC CATHETERIZATION N/A 4/23/2024    Procedure: Left & Right Heart Cath w Angiography & LV;  Surgeon: Alex Pacheco MD;  Location: [facility] Cardiac Cath Lab;  Service: Cardiovascular;  Laterality: N/A;  LEFT AND RIGHT HEART CATH TUES APRIL 23RD, 2024 AT 12:00 NOON PT WILL ARRIVE AT 10:30 AM @ [facility] DR. PACHECO    CARPAL TUNNEL RELEASE  01/13/2016    Neuroplasty Median Nerve At Carpal Tunnel    INVASIVE VASCULAR PROCEDURE N/A 4/23/2024    Procedure: Aortogram;  Surgeon: Alex Pacheco MD;  Location: [facility] Cardiac Cath Lab;  Service: Cardiovascular;  Laterality: N/A;    SHOULDER SURGERY  12/02/2014    Shoulder Surgery     Social History  Social History     Tobacco Use    Smoking status: Never    Smokeless tobacco: Never   Substance Use Topics    Alcohol use: Never    Drug use: Never     Allergies  Reviewed in EMR    Objective   Last Recorded Vitals  Blood pressure 123/50, pulse 71, temperature 36.4 °C (97.6 °F), temperature source Oral, resp. rate 18, height 1.577 m (5' 2.09\"), weight 51.7 kg (113 lb 15.7 oz), SpO2 98%.    1a  Level of " "consciousness: 0=alert; keenly responsive   1b. LOC questions:  0=Performs both tasks correctly   1c. LOC commands: 0=Performs both tasks correctly   2.  Best Gaze: 0=normal   3. Visual: 0=No visual loss   4. Facial Palsy: 0=Normal symmetric movement   5a. Motor Left Arm: 0=No drift, limb holds 90 (or 45) degrees for full 10 seconds   5b.  Motor Right Arm: 0=No drift, limb holds 90 (or 45) degrees for full 10 seconds   6a. Motor Left Le=No drift, limb holds 90 (or 45) degrees for full 10 seconds   6b  Motor Right Le=No drift, limb holds 90 (or 45) degrees for full 10 seconds   7. Limb Ataxia: 0=Absent   8.  Sensory: 0=Normal; no sensory loss   9. Best Language:  0=No aphasia, normal   10. Dysarthria: 0=Normal   11. Extinction and Inattention: 0=No abnormality          Total:   0   General appearance: no acute distress.  Regular cardiac rate, no cervical bruits.   Neurological Exam:    Mental status: The patient was alert, interactive and cooperative with an appropriate affect.    Speech is clear.  Language intact for comprehension, expression, and vocabulary.  Oriented to time and place, good fund of knowledge for current events.   Cranial Nerves- Visual fields full to confrontation. Ocular movements full without nystagmus. Facial sensation and strength are normal.  Shoulder shrug is 5/5.   Motor- No abnormal or adventitious movements were noted.  Muscle strength is 5/5.  Hand dexterity is mildly slower with dominant R hand, she calls \"numbness\" but not associated with any sensory deficit.   Coordination testing shows no limb dystaxia.   Gait was not tested- s/p bilateral TKR, reports ambulation with no device.     Reviewed relevant results, independent review of imaging:   CT brain attack head wo IV contrast    Result Date: 2025  Interpreted By:  Filemon Renee, STUDY: CT BRAIN ATTACK HEAD WO IV CONTRAST;  2025 9:04 pm   INDICATION: Signs/Symptoms:acute R arm paresthesia, slurred speech.   " COMPARISON: None.   ACCESSION NUMBER(S): [id number]   ORDERING CLINICIAN: STEPAN GOODWIN   TECHNIQUE: Axial noncontrast CT images of the head. Sagittal and coronal reformats were provided.   FINDINGS: BRAIN: No acute intracranial hemorrhage. No mass effect or midline shift. Gray-white matter interfaces are preserved.Patchy white matter hypodensities which are nonspecific but likely related to microangiopathic white matter changes.   VENTRICLES and EXTRA-AXIAL SPACES: Prominent ventricles and extra-axial CSF spaces, reflecting parenchymal volume loss.   EXTRACRANIAL SOFT TISSUES: Within normal limits.   PARANASAL SINUSES/MASTOIDS: The visualized paranasal sinuses and mastoid air cells are aerated.   BONES AND ORBITS: No displaced skull fracture. Orbits are within normal limits.   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       1. No acute intracranial hemorrhage or mass effect. 2. White matter hypoattenuation, ossific, however likely sequelae of chronic microvascular ischemic change.   MACRO: Filemon Renee discussed the significance and urgency of this critical finding by telephone with  STEPAN GOODWIN on 4/9/2025 at 9:10 pm.  (**-RCF-**) Findings:  See findings.   Signed by: Filemon Renee 4/9/2025 9:11 PM Dictation workstation:   [id number]   MR brain wo IV contrast    Result Date: 4/10/2025  Interpreted By:  Alix Betancourt, STUDY: MR BRAIN WO IV CONTRAST;  4/10/2025 8:06 am   INDICATION: Signs/Symptoms:stroke like symptoms.     COMPARISON: Head CT April 9, 2025   ACCESSION NUMBER(S): [id number]   ORDERING CLINICIAN: NGUYEN JUARES   TECHNIQUE: Axial T2, FLAIR, DWI, gradient echo T2 and sagittal and coronal T1 weighted images of brain were acquired.   FINDINGS: CSF Spaces: There is prominence of ventricles and sulci compatible with diffuse parenchymal volume loss. The degree of ventriculomegaly is somewhat disproportionate with the size of the sulci.    Parenchyma: There is subtle asymmetric curvilinear hyperintensity on diffusion-imaging along the cortical surface of the right frontal lobe involving the operculum and extending into the anterior aspect of the insula. There are additional small foci of increased signal on diffusion imaging in the posterior left frontal lobe and left parietal lobe. There are patchy and confluent hyperintensities on FLAIR and T2 weighted imaging in the subcortical and periventricular white matter. There is involvement of the mark. There is no mass effect or midline shift.   Paranasal Sinuses and Mastoids: There is trace mucosal thickening within scattered paranasal sinuses. The mastoid air cells are clear.   The sagittal T1 weighted images demonstrate marked degenerative changes of the visualized portion of the cervical spine with central canal stenosis due to posterior disc/osteophyte complex formation, suboptimally assessed on the basis of this examination.       1. Scattered small foci of suspected acute to early subacute ischemic injury involving bilateral MCA distributions raising suspicion for an embolic process. 2. Nonspecific white matter changes most likely represent small-vessel ischemic disease in a patient of this age and also involve the mark. 3. Diffuse parenchymal volume loss.     MACRO: Critical Finding:  Acute infarct. Notification was initiated on 4/10/2025 at 8:26 am by  Alix Betancourt.  (**-OCF-**)   Signed by: Alix Betancourt 4/10/2025 8:26 AM Dictation workstation:   [id number]     Encounter Date: 02/26/25   ECG 12 lead (Clinic Performed)   Result Value    Ventricular Rate 79    Atrial Rate 79    MD Interval 172    QRS Duration 114    QT Interval 420    QTC Calculation(Bazett) 481    P Axis 74    R Axis -31    T Axis 99    QRS Count 13    Q Onset 218    P Onset 132    P Offset 170    T Offset 428    QTC Fredericia 460    Narrative    Normal sinus rhythm  Left axis deviation  Incomplete right bundle branch  block  Left ventricular hypertrophy with repolarization abnormality  Prolonged QT  Abnormal ECG  Confirmed by Alex Onofre ([id number]) on 2/26/2025 3:58:44 PM      No echocardiogram results found for the past 14 days  No results found for this or any previous visit.        Results from last 7 days   Lab Units 04/09/25  2125   HEMOGLOBIN A1C % 5.0     BNP   Date/Time Value Ref Range Status   04/09/2025 09:25  (H) 0 - 99 pg/mL Final     Results for orders placed or performed during the hospital encounter of 04/09/25 (from the past 24 hours)   POCT GLUCOSE   Result Value Ref Range    POCT Glucose 124 (H) 74 - 99 mg/dL   CBC and Auto Differential   Result Value Ref Range    WBC 6.1 4.4 - 11.3 x10*3/uL    nRBC 0.0 0.0 - 0.0 /100 WBCs    RBC 3.98 (L) 4.00 - 5.20 x10*6/uL    Hemoglobin 10.7 (L) 12.0 - 16.0 g/dL    Hematocrit 31.4 (L) 36.0 - 46.0 %    MCV 79 (L) 80 - 100 fL    MCH 26.9 26.0 - 34.0 pg    MCHC 34.1 32.0 - 36.0 g/dL    RDW 15.8 (H) 11.5 - 14.5 %    Platelets 186 150 - 450 x10*3/uL    Neutrophils % 47.1 40.0 - 80.0 %    Immature Granulocytes %, Automated 0.2 0.0 - 0.9 %    Lymphocytes % 39.3 13.0 - 44.0 %    Monocytes % 8.4 2.0 - 10.0 %    Eosinophils % 4.8 0.0 - 6.0 %    Basophils % 0.2 0.0 - 2.0 %    Neutrophils Absolute 2.86 1.60 - 5.50 x10*3/uL    Immature Granulocytes Absolute, Automated 0.01 0.00 - 0.50 x10*3/uL    Lymphocytes Absolute 2.38 0.80 - 3.00 x10*3/uL    Monocytes Absolute 0.51 0.05 - 0.80 x10*3/uL    Eosinophils Absolute 0.29 0.00 - 0.40 x10*3/uL    Basophils Absolute 0.01 0.00 - 0.10 x10*3/uL   Magnesium   Result Value Ref Range    Magnesium 1.73 1.60 - 2.40 mg/dL   Comprehensive metabolic panel   Result Value Ref Range    Glucose 124 (H) 74 - 99 mg/dL    Sodium 142 136 - 145 mmol/L    Potassium 3.9 3.5 - 5.3 mmol/L    Chloride 109 (H) 98 - 107 mmol/L    Bicarbonate 27 21 - 32 mmol/L    Anion Gap 10 10 - 20 mmol/L    Urea Nitrogen 22 6 - 23 mg/dL    Creatinine 0.83 0.50 - 1.05 mg/dL     eGFR 69 >60 mL/min/1.73m*2    Calcium 9.4 8.6 - 10.3 mg/dL    Albumin 3.5 3.4 - 5.0 g/dL    Alkaline Phosphatase 85 33 - 136 U/L    Total Protein 6.6 6.4 - 8.2 g/dL    AST 37 9 - 39 U/L    Bilirubin, Total 0.4 0.0 - 1.2 mg/dL    ALT 25 7 - 45 U/L   Troponin I, High Sensitivity   Result Value Ref Range    Troponin I, High Sensitivity 22 (H) 0 - 13 ng/L   TSH with reflex to Free T4 if abnormal   Result Value Ref Range    Thyroid Stimulating Hormone <37.50 (H) 0.44 - 3.98 mIU/L   Coagulation Screen   Result Value Ref Range    Protime 13.1 (H) 9.8 - 12.4 seconds    INR 1.2 (H) 0.9 - 1.1    aPTT 30 26 - 36 seconds   Thyroxine, Free   Result Value Ref Range    Thyroxine, Free 1.05 0.61 - 1.12 ng/dL   Lipid Panel   Result Value Ref Range    Cholesterol 138 0 - 199 mg/dL    HDL-Cholesterol 53.4 mg/dL    Cholesterol/HDL Ratio 2.6     LDL Calculated 75 <=99 mg/dL    VLDL 10 0 - 40 mg/dL    Triglycerides 49 0 - 149 mg/dL    Non HDL Cholesterol 85 0 - 149 mg/dL   Hemoglobin A1C   Result Value Ref Range    Hemoglobin A1C 5.0 See comment %    Estimated Average Glucose 97 Not Established mg/dL   B-Type Natriuretic Peptide   Result Value Ref Range     (H) 0 - 99 pg/mL   Urinalysis with Reflex Culture and Microscopic   Result Value Ref Range    Color, Urine Light-Yellow Light-Yellow, Yellow, Dark-Yellow    Appearance, Urine Clear Clear    Specific Gravity, Urine 1.021 1.005 - 1.035    pH, Urine 5.0 5.0, 5.5, 6.0, 6.5, 7.0, 7.5, 8.0    Protein, Urine NEGATIVE NEGATIVE, 10 (TRACE), 20 (TRACE) mg/dL    Glucose, Urine Normal Normal mg/dL    Blood, Urine NEGATIVE NEGATIVE mg/dL    Ketones, Urine NEGATIVE NEGATIVE mg/dL    Bilirubin, Urine NEGATIVE NEGATIVE mg/dL    Urobilinogen, Urine Normal Normal mg/dL    Nitrite, Urine NEGATIVE NEGATIVE    Leukocyte Esterase, Urine 500 Stan/uL (A) NEGATIVE   Microscopic Only, Urine   Result Value Ref Range    WBC, Urine 21-50 (A) 1-5, NONE /HPF    RBC, Urine 3-5 NONE, 1-2, 3-5 /HPF    Squamous  Epithelial Cells, Urine 1-9 (SPARSE) Reference range not established. /HPF    Bacteria, Urine 2+ (A) NONE SEEN /HPF    Mucus, Urine FEW Reference range not established. /LPF   Basic Metabolic Panel   Result Value Ref Range    Glucose 85 74 - 99 mg/dL    Sodium 141 136 - 145 mmol/L    Potassium 4.0 3.5 - 5.3 mmol/L    Chloride 109 (H) 98 - 107 mmol/L    Bicarbonate 28 21 - 32 mmol/L    Anion Gap 8 (L) 10 - 20 mmol/L    Urea Nitrogen 18 6 - 23 mg/dL    Creatinine 0.83 0.50 - 1.05 mg/dL    eGFR 69 >60 mL/min/1.73m*2    Calcium 9.2 8.6 - 10.3 mg/dL   CBC and Auto Differential   Result Value Ref Range    WBC 5.0 4.4 - 11.3 x10*3/uL    nRBC 0.0 0.0 - 0.0 /100 WBCs    RBC 3.87 (L) 4.00 - 5.20 x10*6/uL    Hemoglobin 10.2 (L) 12.0 - 16.0 g/dL    Hematocrit 30.3 (L) 36.0 - 46.0 %    MCV 78 (L) 80 - 100 fL    MCH 26.4 26.0 - 34.0 pg    MCHC 33.7 32.0 - 36.0 g/dL    RDW 15.7 (H) 11.5 - 14.5 %    Platelets 176 150 - 450 x10*3/uL    Neutrophils % 35.9 40.0 - 80.0 %    Immature Granulocytes %, Automated 0.0 0.0 - 0.9 %    Lymphocytes % 49.4 13.0 - 44.0 %    Monocytes % 8.9 2.0 - 10.0 %    Eosinophils % 5.4 0.0 - 6.0 %    Basophils % 0.4 0.0 - 2.0 %    Neutrophils Absolute 1.78 1.60 - 5.50 x10*3/uL    Immature Granulocytes Absolute, Automated 0.00 0.00 - 0.50 x10*3/uL    Lymphocytes Absolute 2.45 0.80 - 3.00 x10*3/uL    Monocytes Absolute 0.44 0.05 - 0.80 x10*3/uL    Eosinophils Absolute 0.27 0.00 - 0.40 x10*3/uL    Basophils Absolute 0.02 0.00 - 0.10 x10*3/uL   T4, free   Result Value Ref Range    Thyroxine, Free 1.11 0.61 - 1.12 ng/dL   Troponin I, High Sensitivity   Result Value Ref Range    Troponin I, High Sensitivity 20 (H) 0 - 13 ng/L   TSH   Result Value Ref Range    Thyroid Stimulating Hormone 2.66 0.44 - 3.98 mIU/L         Diagnoses:   Assessment & Plan  Acute arterial ischemic stroke, multifocal, multiple vascular territories (Multi)  Pattern consistent with proximal source of embolism- potential causes- occult AF with  severely dilated LA, reduced EF, aortic arch atheromatous disease.  Recommend ambulatory heart monitor after DC, continue aspirin + clopidogrel for now, discussed limited echo to evaluate for a clear cardiac source of embolism. Artery to artery embolism bilaterally would be uncommon so vessels could be less invasively assessed iwth US than Cta.     No active rehab needs, cleared for DC  to home with family after testing from neuro perspective.   Reviewed management of multiple medical conditions and vascular risk factors- she is at goal.   I will have my office contact her for followup as outpatient.     Full Code            Areli Moura MD

## 2025-04-10 NOTE — DISCHARGE INSTRUCTIONS
[facility] Observation Unit Discharge Instructions  You came to the hospital with right upper extremity weakness and slurred speech- and were admitted for observation and further care.   You were evaluated by our Neurology Team, and MRI of your Brain was done and confirmed that you did in fact have a stroke.   Please continue taking aspirin, and plavix every day. Continue your Atorvastatin (Lipitor) every day as well.       Your discharge plan is to go home to recover.     Please see your primary care doctor in 1 week for follow-up.   An appointment referral has been requested for you but may you need to call your doctors office or use Tweetflow to schedule.     Additionally, referrals have been ordered for you to follow up with Neurology  The office or scheduling department should call you to arrange an appointment in the next week or two.      For any issues or concerns with appointments, call the  scheduling line at [phone] or the providers office directly.        See the attached information for education about any new medications and the condition(s) you were treated for.  Your medications may have changed so pay close attention to the list given to you at discharge and ask any questions you have before leaving the hospital.

## 2025-04-10 NOTE — HOSPITAL COURSE
hypertension, hyperlipidemia, coronary disease, and aortic valve disease, arthritis, and history of remote shoulder surgery who presents to [facility] ED for complaints of slurred speech and RUE weakness and numbness.     ED work up revealed:   Vital signs: 36.6 (97.8), HR 68, RR 18, /68, and 100% on room air  Labs: glucose 124, chloride 109, troponin 22, TSH < 37.50, Hemoglobin 10.7/ hematocrit   Imaging:      Stroke-like symptoms  - CTH negative  - MRI Brain and MRA Head/ neck ordered  - ECHO ordered  - Continuous cardiac monitoring  - Continue 81 mg aspirin, 75 mg plavix, and 80 mg atorvastatin  - Neurology consultation if MRI's reveal anything acute    UA pending      TSH <37.50

## 2025-04-10 NOTE — DISCHARGE SUMMARY
Discharge Diagnosis  Acute arterial ischemic stroke, multifocal, multiple vascular territories (Multi)    Issues Requiring Follow-Up  CVA    Discharge Meds     Medication List      START taking these medications     clopidogrel 75 mg tablet; Commonly known as: Plavix; Take 1 tablet (75   mg) by mouth once daily.     CONTINUE taking these medications     aspirin 81 mg chewable tablet   atorvastatin 80 mg tablet; Commonly known as: Lipitor; TAKE 1 TABLET BY   MOUTH AT BEDTIME   cycloSPORINE 0.05 % ophthalmic emulsion; Commonly known as: Restasis;   Administer 1 drop into both eyes 2 times a day.   ezetimibe 10 mg tablet; Commonly known as: Zetia; TAKE 1 TABLET(10 MG)   BY MOUTH DAILY AT BEDTIME   hydroCHLOROthiazide 25 mg tablet; Commonly known as: HYDRODiuril; Take 1   tablet (25 mg) by mouth once daily.   metoprolol tartrate 25 mg tablet; Commonly known as: Lopressor; TAKE 1/2   TABLET BY MOUTH TWICE DAILY   walker misc       Test Results Pending At Discharge  Pending Labs       Order Current Status    Extra Urine Gray Tube In process    Urinalysis with Reflex Culture and Microscopic In process    Urine Culture In process          Hospital Course  Zabrina Syed is a 84 y.o. female with a past medical history of  hypertension, hyperlipidemia, coronary disease, aortic valve disease, arthritis, carpal tunnel and history of remote shoulder surgery ( ~10 years ago) who presents to [facility] ED for complaints of slurred speech and RUE weakness and numbness. Patient was struggling to open a meat jar when after opening it her right arm became weak and limp. She was unable to lift arm or move fingers. She called her son on the phone ( who felt that she was having slurred speech as well) suggested that she call 911.  Upon arrival to ED, her right arm weakness, and limpness had resolved. She still has residual tingling in her hand ( which she states is baseline, as she had carpal tunnel).     - CTH negative  - MRI  Brain : Scattered small foci of suspected acute to early subacute ischemic  injury involving bilateral MCA distributions raising suspicion for an embolic process. Nonspecific white matter changes most likely represent small-vessel ischemic disease in a patient of this age and also involve the mark. Diffuse parenchymal volume loss.   - ECHO Left ventricular ejection fraction is mildly decreased, calculated by Thornton's biplane at 44%.   2. There is global hypokinesis of the left ventricle with minor regional variations.   3. Left ventricular diastolic filling was not assessed.   4. No left ventricular thrombus visualized.   5. Right ventricular volume and pressure overload.   6. There is severely increased septal and moderately increased posterior left ventricular wall thickness.   7. There is severely increased concentric left ventricular hypertrophy.   8. There is mildly reduced right ventricular systolic function.   9. The left atrium is enlarged.  10. Moderate to severely elevated right ventricular systolic pressure.  11. Moderate aortic valve stenosis.  12. There is moderate aortic valve cusp calcification.  13. Moderate aortic valve regurgitation.  14. There is no evidence of a patent foramen ovale.  - ECG: NSR 1st degree AVB  - Continue 81 mg aspirin, START 75 mg plavix, and 80 mg atorvastatin     HTN; stable continue hydrochlorothiazide  HLD; stable, continue statin    Discharge: home with cardiac holter monitor, and with follow up with Neurology and PCP. Patient agreeable to discharge.    Pertinent Physical Exam At Time of Discharge  Physical Exam  Constitutional: NAD, pt alert and cooperative  Eyes: no icterus  ENMT: mucous membranes moist, no lesions seen  Head/Neck: Neck supple  Respiratory/Thorax: CTA bilaterally, non-labored breathing, no cough, on RA  Cardiovascular: Regular rate and rhythm, no murmurs heard  Gastrointestinal: Nondistended, soft, non-tender, BS present x 4  : no Green, no SP  discomfort  Musculoskeletal: ROM intact, no joint swelling  Extremities: normal extremities, no edema  Neurological: A&O x 3, speech clear, follows commands appropriately, cr. n. grossly intact, sensation grossly intact, motor 5/5 throughout  Skin: Warm and dry, no lesions, no rashes  Psych: calm, stable mood    Outpatient Follow-Up  Future Appointments   Date Time Provider Department Center   8/26/2025 10:00 AM Alex Onofre MD [id number] [facility]   9/10/2025 10:00 AM DAHIANA [id number] [facility]     Cathie Malone, APRN-CNP   Yes

## 2025-04-10 NOTE — NURSING NOTE
Arrived to unit from ER, placed in bed, assessment completed, neuro assessment completed, skin intact, swallow eval completed

## 2025-04-10 NOTE — PROGRESS NOTES
Pharmacy Medication History Review    Zabrina Syed is a 84 y.o. female admitted for TIA (transient ischemic attack). Pharmacy reviewed the patient's [id number] medications and allergies for accuracy.    The list below reflectives the updated PTA list. Please review each medication in order reconciliation for additional clarification and justification.  Medications Prior to Admission   Medication Sig Dispense Refill Last Dose/Taking    aspirin 81 mg chewable tablet Chew 1 tablet (81 mg) once daily.   4/9/2025    atorvastatin (Lipitor) 80 mg tablet TAKE 1 TABLET BY MOUTH AT BEDTIME 90 tablet 1 Past Week    cycloSPORINE (Restasis) 0.05 % ophthalmic emulsion Administer 1 drop into both eyes 2 times a day. 180 mL 1 Past Week    ezetimibe (Zetia) 10 mg tablet TAKE 1 TABLET(10 MG) BY MOUTH DAILY AT BEDTIME 90 tablet 2 Past Week    hydroCHLOROthiazide (HYDRODiuril) 25 mg tablet Take 1 tablet (25 mg) by mouth once daily. 90 tablet 3 4/9/2025    metoprolol tartrate (Lopressor) 25 mg tablet TAKE 1/2 TABLET BY MOUTH TWICE DAILY 90 tablet 1 4/9/2025    walker misc Use as Directed.   Past Week    clopidogrel (Plavix) 75 mg tablet Take 1 tablet (75 mg) by mouth once daily. (Patient not taking: Reported on 4/10/2025) 90 tablet 3 Not Taking        The list below reflectives the updated allergy list. Please review each documented allergy for additional clarification and justification.  Allergies  Reviewed by Rhonda Arroyo PharmD on 4/10/2025        Severity Reactions Comments    Penicillins Low Hives, Itching, Rash, Swelling             Below are additional concerns with the patient's PTA list.  The following updates were made to the Prior to Admission medication list:     Source of Information:     Medications ADDED:   N/A  Medications CHANGED:  N/A  Medications REMOVED:   Brimonidine  Medications NOT TAKING:   Plavix    Allergy reviewed : Yes    Meds 2 Beds : Yes    Comments: Spoke to patient at bedside. States that she  takes care of medication on her own. Was not able to completely recognize names of medications, but was able to differentiate a few after reading the directions. States she does not take Plavix or use the Brimonidine eye drops. States [facility] on [address] - called pharmacy and they state she has not filled any prescriptions since August of 2024.       Rhonda Arroyo, PharmD

## 2025-04-10 NOTE — ASSESSMENT & PLAN NOTE
Pattern consistent with proximal source of embolism- potential causes- occult AF with severely dilated LA, reduced EF, aortic arch atheromatous disease.  Recommend ambulatory heart monitor after DC, continue aspirin + clopidogrel for now, discussed limited echo to evaluate for a clear cardiac source of embolism. Artery to artery embolism bilaterally would be uncommon so vessels could be less invasively assessed iwth US than Cta.     No active rehab needs, cleared for DC  to home with family after testing from neuro perspective.   Reviewed management of multiple medical conditions and vascular risk factors- she is at goal.   I will have my office contact her for followup as outpatient.

## 2025-04-10 NOTE — ED TRIAGE NOTES
Pt son says that the patient has had slurred speech starting 1 hr ago .  Patient complains of some numbness to the right side of her face. [id number]

## 2025-04-10 NOTE — H&P
History Of Present Illness  Zabrina Syed is a 84 y.o. female with a past medical history of  hypertension, hyperlipidemia, coronary disease, aortic valve disease, arthritis, carpal tunnel and history of remote shoulder surgery ( ~1o years ago) who presents to [facility] ED for complaints of slurred speech and RUE weakness and numbness. Patient was struggling to open a meat jar when after opening it her right arm became weak and limp. She was unable to lift arm or move fingers. She called her son on the phone ( who felt that she was having slurred speech as well) suggested that she call 911.  Upon arrival to ED, her right arm weakness, and limpness had resolved. She still has residual tingling in her hand ( which she states is baseline, as she had carpal tunnel). Denies any headache, blurred vision, difficulty speaking. Denies any chest pain, shortness of breath, difficulty breathing.     ED work up revealed:   Vital signs: 36.6 (97.8), HR 68, RR 18, /68, and 100% on room air  Labs: glucose 124, chloride 109, troponin 22, TSH < 37.50, Hemoglobin 10.7/ hematocrit   Imaging: CT Head: No acute intracranial hemorrhage or mass effect.   2. White matter hypoattenuation, ossific, however likely sequelae of   chronic microvascular ischemic change.     Past Medical History  She has a past medical history of Low-tension glaucoma, unspecified eye, stage unspecified (12/01/2015), Personal history of other diseases of the circulatory system (01/21/2020), and Personal history of other medical treatment.    Surgical History  She has a past surgical history that includes Carpal tunnel release (01/13/2016); Appendectomy (12/02/2014); Shoulder surgery (12/02/2014); Cardiac catheterization (N/A, 4/23/2024); and Invasive Vascular Procedure (N/A, 4/23/2024).     Social History  She reports that she has never smoked. She has never used smokeless tobacco. She reports that she does not drink alcohol and does not use  drugs.    Family History  No family history on file.     Allergies  Penicillin and Penicillins    Review of Systems   Constitutional: Negative.    HENT: Negative.     Eyes: Negative.    Respiratory: Negative.     Cardiovascular: Negative.    Gastrointestinal: Negative.    Endocrine: Negative.    Genitourinary: Negative.    Musculoskeletal: Negative.    Skin: Negative.    Allergic/Immunologic: Negative.    Neurological:  Positive for speech difficulty and weakness.   Hematological: Negative.    Psychiatric/Behavioral: Negative.     All other systems reviewed and are negative.    Physical Exam  Constitutional: NAD, pt alert and cooperative  Eyes: no icterus  ENMT: mucous membranes moist, no lesions seen  Head/Neck: Neck supple  Respiratory/Thorax: CTA bilaterally, non-labored breathing, no cough, on RA  Cardiovascular: Regular rate and rhythm, no murmurs heard  Gastrointestinal: Nondistended, soft, non-tender, BS present x 4  : no Green, no SP discomfort  Musculoskeletal: ROM intact, R< L,  no joint swelling  Extremities: normal extremities, no edema  Neurological: A&O x 3, speech clear, follows commands appropriately, cr. n. grossly intact, sensation grossly intact, motor 5/5 throughout  Skin: Warm and dry, no lesions, no rashes  Psych: calm, stable mood    Last Recorded Vitals  /61   Pulse 64   Temp 36.6 °C (97.8 °F) (Oral)   Resp 16   Wt 51.7 kg (114 lb)   SpO2 97%     Relevant Results  Scheduled medications  [START ON 4/10/2025] polyethylene glycol, 17 g, oral, Daily      Continuous medications     PRN medications  PRN medications: labetaloL, oxygen  Results for orders placed or performed during the hospital encounter of 04/09/25 (from the past 24 hours)   POCT GLUCOSE   Result Value Ref Range    POCT Glucose 124 (H) 74 - 99 mg/dL   CBC and Auto Differential   Result Value Ref Range    WBC 6.1 4.4 - 11.3 x10*3/uL    nRBC 0.0 0.0 - 0.0 /100 WBCs    RBC 3.98 (L) 4.00 - 5.20 x10*6/uL    Hemoglobin 10.7  (L) 12.0 - 16.0 g/dL    Hematocrit 31.4 (L) 36.0 - 46.0 %    MCV 79 (L) 80 - 100 fL    MCH 26.9 26.0 - 34.0 pg    MCHC 34.1 32.0 - 36.0 g/dL    RDW 15.8 (H) 11.5 - 14.5 %    Platelets 186 150 - 450 x10*3/uL    Neutrophils % 47.1 40.0 - 80.0 %    Immature Granulocytes %, Automated 0.2 0.0 - 0.9 %    Lymphocytes % 39.3 13.0 - 44.0 %    Monocytes % 8.4 2.0 - 10.0 %    Eosinophils % 4.8 0.0 - 6.0 %    Basophils % 0.2 0.0 - 2.0 %    Neutrophils Absolute 2.86 1.60 - 5.50 x10*3/uL    Immature Granulocytes Absolute, Automated 0.01 0.00 - 0.50 x10*3/uL    Lymphocytes Absolute 2.38 0.80 - 3.00 x10*3/uL    Monocytes Absolute 0.51 0.05 - 0.80 x10*3/uL    Eosinophils Absolute 0.29 0.00 - 0.40 x10*3/uL    Basophils Absolute 0.01 0.00 - 0.10 x10*3/uL   Magnesium   Result Value Ref Range    Magnesium 1.73 1.60 - 2.40 mg/dL   Comprehensive metabolic panel   Result Value Ref Range    Glucose 124 (H) 74 - 99 mg/dL    Sodium 142 136 - 145 mmol/L    Potassium 3.9 3.5 - 5.3 mmol/L    Chloride 109 (H) 98 - 107 mmol/L    Bicarbonate 27 21 - 32 mmol/L    Anion Gap 10 10 - 20 mmol/L    Urea Nitrogen 22 6 - 23 mg/dL    Creatinine 0.83 0.50 - 1.05 mg/dL    eGFR 69 >60 mL/min/1.73m*2    Calcium 9.4 8.6 - 10.3 mg/dL    Albumin 3.5 3.4 - 5.0 g/dL    Alkaline Phosphatase 85 33 - 136 U/L    Total Protein 6.6 6.4 - 8.2 g/dL    AST 37 9 - 39 U/L    Bilirubin, Total 0.4 0.0 - 1.2 mg/dL    ALT 25 7 - 45 U/L   Troponin I, High Sensitivity   Result Value Ref Range    Troponin I, High Sensitivity 22 (H) 0 - 13 ng/L   TSH with reflex to Free T4 if abnormal   Result Value Ref Range    Thyroid Stimulating Hormone <37.50 (H) 0.44 - 3.98 mIU/L   Coagulation Screen   Result Value Ref Range    Protime 13.1 (H) 9.8 - 12.4 seconds    INR 1.2 (H) 0.9 - 1.1    aPTT 30 26 - 36 seconds   Thyroxine, Free   Result Value Ref Range    Thyroxine, Free 1.05 0.61 - 1.12 ng/dL     CT brain attack head wo IV contrast    Result Date: 4/9/2025  Interpreted By:  Víctor  Filemon, STUDY: CT BRAIN ATTACK HEAD WO IV CONTRAST;  4/9/2025 9:04 pm   INDICATION: Signs/Symptoms:acute R arm paresthesia, slurred speech.   COMPARISON: None.   ACCESSION NUMBER(S): [id number]   ORDERING CLINICIAN: STEPAN GOODWIN   TECHNIQUE: Axial noncontrast CT images of the head. Sagittal and coronal reformats were provided.   FINDINGS: BRAIN: No acute intracranial hemorrhage. No mass effect or midline shift. Gray-white matter interfaces are preserved.Patchy white matter hypodensities which are nonspecific but likely related to microangiopathic white matter changes.   VENTRICLES and EXTRA-AXIAL SPACES: Prominent ventricles and extra-axial CSF spaces, reflecting parenchymal volume loss.   EXTRACRANIAL SOFT TISSUES: Within normal limits.   PARANASAL SINUSES/MASTOIDS: The visualized paranasal sinuses and mastoid air cells are aerated.   BONES AND ORBITS: No displaced skull fracture. Orbits are within normal limits.   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       1. No acute intracranial hemorrhage or mass effect. 2. White matter hypoattenuation, ossific, however likely sequelae of chronic microvascular ischemic change.   MACRO: Filemon Renee discussed the significance and urgency of this critical finding by telephone with  STEPAN GOODWIN on 4/9/2025 at 9:10 pm.  (**-RCF-**) Findings:  See findings.   Signed by: Filemno Renee 4/9/2025 9:11 PM Dictation workstation:   [id number]      Assessment/Plan   Assessment & Plan  TIA (transient ischemic attack)    Zabrina Syed is a 84 y.o. female with a past medical history of  hypertension, hyperlipidemia, coronary disease, aortic valve disease, arthritis, carpal tunnel and history of remote shoulder surgery ( ~1o years ago) who presents to [facility] ED for complaints of slurred speech and RUE weakness and numbness. Patient was struggling to open a meat jar when after opening it her right arm  became weak and limp. She was unable to lift arm or move fingers. She called her son on the phone ( who felt that she was having slurred speech as well) suggested that she call 911.  Upon arrival to ED, her right arm weakness, and limpness had resolved. She still has residual tingling in her hand ( which she states is baseline, as she had carpal tunnel). Denies any headache, blurred vision, difficulty speaking. Denies any chest pain, shortness of breath, difficulty breathing.     ED work up revealed:   Vital signs: 36.6 (97.8), HR 68, RR 18, /68, and 100% on room air  Labs: glucose 124, chloride 109, troponin 22, TSH < 37.50, Hemoglobin 10.7/ hematocrit   Imaging: CT Head: No acute intracranial hemorrhage or mass effect.   2. White matter hypoattenuation, ossific, however likely sequelae of   chronic microvascular ischemic change.     Stroke-like symptoms  - Presented with slurred speech, RUE weakness and numbness  - CTH negative  - MRI Brain and MRA Head/ neck ordered  - ECHO ordered  - UA pending  - Continuous cardiac monitoring  - Continue 81 mg aspirin, 75 mg plavix, and 80 mg atorvastatin  - Neurology consultation if MRI's reveal anything acute  - TSH <37.50   - Free T4 pending    HTN  - stable; continue home regimen    HLD  -stable, continue home regimen    GI/VTE PPX: PPI, SQ lovenox  Code Status: Full Code    Chart, medical history, and labs/testing reviewed in detail.   Case and plan of care to be discussed with attending provider.      Disposition: Discharge home once medically cleared and stable, pending stroke work up    KRISTIE Olea-CNP   Observation/Internal Med [facility]  [facility]  04/09/25  11:44 PM  Total time of 45 minutes spent on professional and overall care, with >50% of time dedicated to counseling/coordination of care.

## 2025-04-10 NOTE — PROGRESS NOTES
Zabrina Syed is a 84 y.o. female on day 0 of admission presenting with TIA (transient ischemic attack).    Plan: positive stroke seen on MRI, patient weakness/numbness has resolved. Awaiting ECHO, and possibly able to DC home if results are ok. Patient is independent with her care, and has been up independently in unit since her admission. Anticipate home this afternoon  Disposition: Home with daughter/son  Barrier: ECHO  ADOD:  today/tomorrow       04/10/25 0922   Discharge Planning   Living Arrangements Children;Family members  (daughter/son)   Support Systems Children;Family members   Assistance Needed independent at baseline, uses walker outside of home, still drives, no defecits at this time. Patient has been up to bathroom and walked around unit independently without use of devices.   Type of Residence Private residence   Number of Stairs to Enter Residence 5   Number of Stairs Within Residence 14  (upstairs- patient states she has not been upstairs and stays on the main level with her son.)   Do you have animals or pets at home? No   Who is requesting discharge planning? Provider   Home or Post Acute Services None   Expected Discharge Disposition Home   Does the patient need discharge transport arranged? No   Patient Choice   Patient / Family choosing to utilize agency / facility established prior to hospitalization No   Stroke Family Assessment   Stroke Family Assessment Needed Yes   Primary Caregiver/Family After Discharge Spouse/Significant Other   Additional Support if 24 hour Supervision Required patient is independent with care   Physical Layout of Home Two Story;Steps to Second Floor;Steps to Enter Home  (does not have any difficulty with steps)   Caregiver/Family can provide assistance with ADL's Yes   Caregiver/Family can provide mobility assistance for transfers in and out of bed, chair or car Yes   Medications: Caregiver/Family can obtain prescriptions Yes   Medications: Caregiver/Family can  assist with medication administration Yes   Medications: Caregiver/Family verbalizes understanding of medications Yes   Caregiver/Family agrees with discharge plan to home Yes   Caregiver/Family verbalizes capability of caring for patient at home Yes   Intensity of Service   Intensity of Service >30 min       Bere Sorensen RN

## 2025-04-10 NOTE — ED PROVIDER NOTES
Emergency Department Provider Note             History of Present Illness     History provided by: Patient  Limitations to History: None  External Records Reviewed: prior ED provider notes, clinic notes, discharge summaries    HPI:  Zabrina Syed is a 84 y.o. female with history including hypertension, hyperlipidemia, CAD, osteoarthritis, carpal tunnel syndrome presenting to the emergency department for concern for stroke symptoms. Her son states symptoms started an hour prior to arrival when he noticed she had slurred speech. She also reported numbness to the right side of her face and clumsiness of her right arm. States she was trying to open a jar with this hand and after this her arm became limp and she had more difficulty lifting her arm than normal or moving her fingers. States this has improved but son feels like her speech is still not at baseline. She denies any leg weakness/paresthesia, vision changes, ataxia, confusion, chest pain.     Physical Exam   Triage vitals:  T 36.6 °C (97.8 °F)  HR 68  /68  RR 18  O2 100 % None (Room air)    General: awake, well-appearing, no distress  Head: normocephalic, atraumatic  Eyes: pupils equal, extraocular movements grossly intact, no conjunctival injection or scleral icterus  ENT: nares patent, moist mucous membranes  Neck: supple, trachea midline, no masses  CV: regular rate and rhythm, well-perfused  Resp: breathing is non-labored, speaking in full sentences. Lungs are clear to auscultation bilaterally  GI: soft, non-distended, non-tender, no rebound or guarding  Extremities: no edema, no gross deformity   Neuro: see NIHSS below     1a  Level of consciousness: 0=alert; keenly responsive   1b. LOC questions:  0=Performs both tasks correctly   1c. LOC commands: 0=Performs both tasks correctly   2.  Best Gaze: 0=normal   3. Visual: 0=No visual loss   4. Facial Palsy: 0=Normal symmetric movement   5a. Motor left arm: 0=No drift, limb holds 90 (or 45) degrees  for full 10 seconds   5b.  Motor right arm: 0=No drift, limb holds 90 (or 45) degrees for full 10 seconds   6a. motor left le=No drift, limb holds 90 (or 45) degrees for full 10 seconds   6b  Motor right le=No drift, limb holds 90 (or 45) degrees for full 10 seconds   7. Limb Ataxia: 0=Absent   8.  Sensory: 0=Normal; no sensory loss   9. Best Language:  0=No aphasia, normal   10. Dysarthria: 1=Mild to moderate, patient slurs at least some words and at worst, can be understood with some difficulty   11. Extinction and Inattention: 0=No abnormality   12. Distal motor function: 0=Normal     Total:   1             ED Course & Medical Decision Making   84 y.o. female with history including hypertension, hyperlipidemia, CAD, osteoarthritis, carpal tunnel syndrome presenting to the emergency department for concern for stroke symptoms. Her NIHSS is 1 for minimal dysarthria, primarily because her son states this is not her baseline. VAN negative. She was taken to CT urgently which shows no acute process. On reevaluation, her speech has resolved per son. She denies any other symptoms. Labs are notable for elevated TSH, free T4 pending, though clinically she is not severely hypothyroid. Given her overall stability with improvement in symptoms, I'm concerned this was a TIA. Discussed admission for TIA workup and they're in agreement. Accepted by the obs team.    Social Determinants Limiting Care: None identified    Results: Independently reviewed and interpreted by me. Please see ED course and MDM for my full interpretation.     Chronic Medical Conditions Significantly Affecting Care: as per [facility]    Patient was discussed with the following consultants/services: None     Care Considerations: as per [facility]    Diagnoses as of 25   TIA (transient ischemic attack)       Disposition   Admission    Procedures   Procedures    MD Cathie Taylor MD  25

## 2025-04-11 LAB — BACTERIA UR CULT: NORMAL

## 2025-04-18 ENCOUNTER — OFFICE VISIT (OUTPATIENT)
Dept: PRIMARY CARE | Facility: CLINIC | Age: 85
End: 2025-04-18
Payer: MEDICARE

## 2025-04-18 VITALS
BODY MASS INDEX: 22.69 KG/M2 | TEMPERATURE: 97.1 F | SYSTOLIC BLOOD PRESSURE: 97 MMHG | WEIGHT: 123.3 LBS | RESPIRATION RATE: 16 BRPM | DIASTOLIC BLOOD PRESSURE: 64 MMHG | HEART RATE: 57 BPM | HEIGHT: 62 IN | OXYGEN SATURATION: 99 %

## 2025-04-18 DIAGNOSIS — I25.10 CORONARY ARTERY DISEASE, UNSPECIFIED VESSEL OR LESION TYPE, UNSPECIFIED WHETHER ANGINA PRESENT, UNSPECIFIED WHETHER NATIVE OR TRANSPLANTED HEART: ICD-10-CM

## 2025-04-18 DIAGNOSIS — I63.89: ICD-10-CM

## 2025-04-18 DIAGNOSIS — I10 PRIMARY HYPERTENSION: ICD-10-CM

## 2025-04-18 DIAGNOSIS — Z00.00 ENCOUNTER FOR PREVENTIVE HEALTH EXAMINATION: Primary | ICD-10-CM

## 2025-04-18 DIAGNOSIS — E78.5 HYPERLIPIDEMIA, UNSPECIFIED HYPERLIPIDEMIA TYPE: ICD-10-CM

## 2025-04-18 LAB
ATRIAL RATE: 69 BPM
P AXIS: 76 DEGREES
P OFFSET: 168 MS
P ONSET: 129 MS
PR INTERVAL: 178 MS
Q ONSET: 218 MS
QRS COUNT: 11 BEATS
QRS DURATION: 122 MS
QT INTERVAL: 450 MS
QTC CALCULATION(BAZETT): 482 MS
QTC FREDERICIA: 471 MS
R AXIS: -32 DEGREES
T AXIS: 122 DEGREES
T OFFSET: 443 MS
VENTRICULAR RATE: 69 BPM

## 2025-04-18 PROCEDURE — 99215 OFFICE O/P EST HI 40 MIN: CPT | Mod: 25 | Performed by: INTERNAL MEDICINE

## 2025-04-18 SDOH — ECONOMIC STABILITY: FOOD INSECURITY: WITHIN THE PAST 12 MONTHS, THE FOOD YOU BOUGHT JUST DIDN'T LAST AND YOU DIDN'T HAVE MONEY TO GET MORE.: NEVER TRUE

## 2025-04-18 SDOH — ECONOMIC STABILITY: FOOD INSECURITY: WITHIN THE PAST 12 MONTHS, YOU WORRIED THAT YOUR FOOD WOULD RUN OUT BEFORE YOU GOT MONEY TO BUY MORE.: NEVER TRUE

## 2025-04-18 ASSESSMENT — PAIN SCALES - GENERAL: PAINLEVEL_OUTOF10: 0-NO PAIN

## 2025-04-18 ASSESSMENT — ACTIVITIES OF DAILY LIVING (ADL)
BATHING: INDEPENDENT
DOING_HOUSEWORK: NEEDS ASSISTANCE
TAKING_MEDICATION: INDEPENDENT
GROCERY_SHOPPING: INDEPENDENT
DRESSING: INDEPENDENT
MANAGING_FINANCES: INDEPENDENT

## 2025-04-18 ASSESSMENT — PATIENT HEALTH QUESTIONNAIRE - PHQ9
2. FEELING DOWN, DEPRESSED OR HOPELESS: NOT AT ALL
1. LITTLE INTEREST OR PLEASURE IN DOING THINGS: NOT AT ALL
SUM OF ALL RESPONSES TO PHQ9 QUESTIONS 1 AND 2: 0

## 2025-04-18 ASSESSMENT — ENCOUNTER SYMPTOMS
OCCASIONAL FEELINGS OF UNSTEADINESS: 0
LOSS OF SENSATION IN FEET: 0
DEPRESSION: 0

## 2025-04-18 ASSESSMENT — LIFESTYLE VARIABLES: HOW MANY STANDARD DRINKS CONTAINING ALCOHOL DO YOU HAVE ON A TYPICAL DAY: PATIENT DOES NOT DRINK

## 2025-04-18 NOTE — PROGRESS NOTES
CC: ED Follow up    History Of Present Illness  Zabrina Syed is a 84 y.o. female presenting for Medicare annual wellness exam. Also recently hospitalized.     Admission on 04/09/25 -04/10/25  Per Review of records,  Ms. Syed presented with slurred speech, RUE weakness and numbness. Ultimately obtained MRI brain that showed bilateral MCA disyrutbutio infarctions raising suspicion for embolic phenomena. ECHO showed LVEF of 44%, no PFO. EKG showed NSR with 1st degree AV block. She was started on DAPT 81 mg ASA plavix 75 mg , 80 mg atorvastatin then discharged with holter monitor, and follow up with Neurology and PCP.    Ms. Syed states that since she has been discharged, she has had no acute neurological deficits (vision changes, hearing changes, facial pain, numbness, or droop, motor deficits, new sensation deficits, loss of balance, or dizziness). She is doing well and has been adherent to her DAPT. She is taking a flight to [place], either on 7/26 or 7/27 and would liek to know if it is safe to travel.      Past Medical History  She has a past medical history of Low-tension glaucoma, unspecified eye, stage unspecified (12/01/2015), Personal history of other diseases of the circulatory system (01/21/2020), and Personal history of other medical treatment.    Surgical History  She has a past surgical history that includes Carpal tunnel release (01/13/2016); Appendectomy (12/02/2014); Shoulder surgery (12/02/2014); Cardiac catheterization (N/A, 4/23/2024); and Invasive Vascular Procedure (N/A, 4/23/2024).     Social History  She reports that she has never smoked. She has never used smokeless tobacco. She reports that she does not drink alcohol and does not use drugs.    Family History  Family History[1]     Allergies  Penicillins    Current Outpatient Medications   Medication Instructions    aspirin 81 mg chewable tablet 1 tablet, Daily    atorvastatin (LIPITOR) 80 mg, oral, Nightly    clopidogrel (PLAVIX) 75  "mg, oral, Daily    cycloSPORINE (Restasis) 0.05 % ophthalmic emulsion 1 drop, Both Eyes, 2 times daily    ezetimibe (Zetia) 10 mg tablet TAKE 1 TABLET(10 MG) BY MOUTH DAILY AT BEDTIME    hydroCHLOROthiazide (HYDRODIURIL) 25 mg, oral, Daily    metoprolol tartrate (LOPRESSOR) 12.5 mg, oral, 2 times daily    walker misc Use as Directed.        Physical Exam  General: Well appearing, sitting comfortably in chair  Resp: Non labored breathing, auscultated posteriorly, lungs CTA bilaterally, no wheeze, rhonchi ,or crackles  CV: Regular rate and rhythm. S3 present  Neuro: Awake and alert, CN 2-12 grossly intact, Shoulder abdurction and adduction 5/5 bilateraly, elbow flexion and extension 5/5 bilaterally, hip extension and flexion 5/5 bilaterally, knee flexion and extension 5/5 bilaterally, dorsiflexion and plantarflexion 5/5 bilaterally  MSK: Moves all extremities w/o difficulty, no lymphedema observed     Last Recorded Vitals  Blood pressure 97/64, pulse 57, temperature 36.2 °C (97.1 °F), temperature source Temporal, resp. rate 16, height 1.575 m (5' 2\"), weight 55.9 kg (123 lb 4.8 oz), SpO2 99%.    Labs  4/10/25  Thyroid negative  25 HA1C 5.0 vs 5.5 1 year ago, Lipids WNL    Imagin/10/25 MRI Brain W/O contrast  IMPRESSION:  1. Scattered small foci of suspected acute to early subacute ischemic  injury involving bilateral MCA distributions raising suspicion for an  embolic process.  2. Nonspecific white matter changes most likely represent  small-vessel ischemic disease in a patient of this age and also  involve the mark.  3. Diffuse parenchymal volume loss.       Assessment/Plan   This is a 84 year old woman with a history of CAD presenting for an ED follow up after a stroke on 25. She has no residual symptoms. No acute neurological deficits on exam. Patient has scheduled follow up with Neurology before her travel. She has follow up with Cardiology after planned travel on . Will reach out to " Cardiology to arrange a visit prior to her travel.    #Stroke  -Follow up with Neurology on 06/06/25  -Continue DAPT  #CAD  -Holter monitor attached  -Follow up with Cardiology  -Will reach out to Cardiology to attempt to schedule a visit sooner than July 26th  #HTN  -Continue meds  #HLD  -Continue meds  #Wellness  RTO in mid July to ensure patient has been risk stratified prior to travel    Cuco Leyva  Medical Student, MS4  [facility] was present for the encounter.  I evaluated the patient and personally participated in the key components, including history, physical examination and medical decision making.  I agree with the student's findings and plan as documented and have discussed the case and management of the patient's care with the student and patient. Greater than 50  minutes was spent in the care and coordination of care of the patient including 7 minutes of depression screening.          [1] No family history on file.

## 2025-04-21 PROBLEM — Z00.00 ENCOUNTER FOR PREVENTIVE HEALTH EXAMINATION: Status: ACTIVE | Noted: 2025-04-21

## 2025-04-28 ENCOUNTER — OFFICE VISIT (OUTPATIENT)
Dept: CARDIOLOGY | Facility: CLINIC | Age: 85
End: 2025-04-28
Payer: MEDICARE

## 2025-04-28 VITALS
DIASTOLIC BLOOD PRESSURE: 62 MMHG | BODY MASS INDEX: 22.13 KG/M2 | HEART RATE: 71 BPM | OXYGEN SATURATION: 98 % | SYSTOLIC BLOOD PRESSURE: 113 MMHG | WEIGHT: 121 LBS

## 2025-04-28 DIAGNOSIS — E78.5 HYPERLIPIDEMIA, UNSPECIFIED HYPERLIPIDEMIA TYPE: ICD-10-CM

## 2025-04-28 DIAGNOSIS — I35.0 NONRHEUMATIC AORTIC VALVE STENOSIS: Primary | ICD-10-CM

## 2025-04-28 DIAGNOSIS — I25.10 CORONARY ARTERY DISEASE INVOLVING NATIVE CORONARY ARTERY OF NATIVE HEART WITHOUT ANGINA PECTORIS: ICD-10-CM

## 2025-04-28 DIAGNOSIS — R01.1 HEART MURMUR: ICD-10-CM

## 2025-04-28 PROCEDURE — 3078F DIAST BP <80 MM HG: CPT | Performed by: NURSE PRACTITIONER

## 2025-04-28 PROCEDURE — 1160F RVW MEDS BY RX/DR IN RCRD: CPT | Performed by: NURSE PRACTITIONER

## 2025-04-28 PROCEDURE — 1159F MED LIST DOCD IN RCRD: CPT | Performed by: NURSE PRACTITIONER

## 2025-04-28 PROCEDURE — 3074F SYST BP LT 130 MM HG: CPT | Performed by: NURSE PRACTITIONER

## 2025-04-28 PROCEDURE — 1036F TOBACCO NON-USER: CPT | Performed by: NURSE PRACTITIONER

## 2025-04-28 PROCEDURE — 1111F DSCHRG MED/CURRENT MED MERGE: CPT | Performed by: NURSE PRACTITIONER

## 2025-04-28 PROCEDURE — 99215 OFFICE O/P EST HI 40 MIN: CPT | Performed by: NURSE PRACTITIONER

## 2025-04-28 ASSESSMENT — ENCOUNTER SYMPTOMS
RESPIRATORY NEGATIVE: 1
LOSS OF SENSATION IN FEET: 0
CONSTITUTIONAL NEGATIVE: 1
CARDIOVASCULAR NEGATIVE: 1
OCCASIONAL FEELINGS OF UNSTEADINESS: 0
DEPRESSION: 0

## 2025-04-28 NOTE — PROGRESS NOTES
Subjective   Zabrina Syed is a 84 y.o. female.    Chief Complaint:  I was in the hospital    HPI  Mrs. Syed is seen for a hospital followup in collaboration with Dr. Onofre.  She was admitted to [facility] for slurred speech and weakness of the right arm and hand attempting to open a jar. Her son called 911 and upon arrival to ED her symptoms had nearly resolved.  She was kept overnight.  Neurology consult felt pattern consistent with emboli and obtained Holter (turned in Friday) She had >70% stenosis of the VAL and is on DAPT.  She has had complete resolution of her symptoms with out recurrence.  She is compliant with medication and tolerating them well.  She Is hoping to go to [place] for her granddaughters graduation in July. She has no new concerns today.       Review of Systems   Constitutional: Negative.   Cardiovascular: Negative.    Respiratory: Negative.     All other systems reviewed and are negative.      Objective   Labs reviewed  Hospital records reviewed  Vitals reviewed.   Constitutional:       Appearance: Not in distress. Frail.   Neck:      Vascular: JVD normal.   Pulmonary:      Effort: Pulmonary effort is normal.      Breath sounds: Normal breath sounds. No wheezing. No rhonchi. No rales.   Chest:      Chest wall: Not tender to palpatation.   Cardiovascular:      Normal rate. Regular rhythm. Normal S1. Normal S2.       Murmurs: There is a grade 3/6 systolic murmur.      No gallop.  No click. No rub.   Edema:     Pretibial: bilateral trace edema of the pretibial area.     Ankle: bilateral trace edema of the ankle.  Abdominal:      Tenderness: There is no abdominal tenderness.   Musculoskeletal: Normal range of motion.         General: No tenderness. Skin:     General: Skin is warm and dry.   Neurological:      General: No focal deficit present.      Mental Status: Alert and oriented to person, place and time.         Lab Review:   Lab Results   Component Value Date     04/10/2025      03/17/2025    K 4.0 04/10/2025    K 4.1 03/17/2025     (H) 04/10/2025     03/17/2025    CO2 28 04/10/2025    CO2 22 03/17/2025    BUN 18 04/10/2025    BUN 23 03/17/2025    CREATININE 0.83 04/10/2025    CREATININE 0.89 03/17/2025    GLUCOSE 85 04/10/2025    GLUCOSE 87 03/17/2025    CALCIUM 9.2 04/10/2025    CALCIUM 9.7 03/17/2025     Lab Results   Component Value Date    WBC 5.0 04/10/2025    WBC 5.1 03/17/2025    HGB 10.2 (L) 04/10/2025    HGB 12.2 03/17/2025    HCT 30.3 (L) 04/10/2025    HCT 37.9 03/17/2025    MCV 78 (L) 04/10/2025    MCV 82.9 03/17/2025     04/10/2025     03/17/2025     Lab Results   Component Value Date    CHOL 138 04/09/2025    CHOL 164 03/17/2025    TRIG 49 04/09/2025    TRIG 74 03/17/2025    HDL 53.4 04/09/2025    HDL 56 03/17/2025         Assessment/Plan   Mrs. Syed is a pleasant 84-year-old female with past medical history significant for hypertension hyperlipidemia, and moderate aortic stenosis, NSTEMI,  LCX PCI 11/22/2021. Recent cath 4/2024 showed moderate AS and patent stent.  She has not been inclined to be overly aggressive in considering potential aortic valve intervention which we agree with.  She presents feeling rather well today and is generally stable from a cardiac standpoint.  VS are stable.  LDL is slightly higher than goal on high intensity statin plus ezetimibe. She remains limited in mobility but has no symptoms referable to angina. She has had no recurrence of neurologic symptoms following a recent stroke.  We are awaiting Holter results (monitor returned Friday). She is on DAPT now and followed by neurology.  We can discuss the results of her monitor by phone. We will see her in June or July prior to her trip to [place].  She will call for any concerns particularly any that would prompt us to reconsider TAVR for her aortic stenosis.

## 2025-04-29 NOTE — PATIENT INSTRUCTIONS
RETURN IN JUNE OR EARLY JULY    WE WILL CALL YOU WITH HOLTER MONITOR RESULTS    CONTINUE ALL MEDICATION

## 2025-05-07 DIAGNOSIS — I25.10 CORONARY ARTERY DISEASE, UNSPECIFIED VESSEL OR LESION TYPE, UNSPECIFIED WHETHER ANGINA PRESENT, UNSPECIFIED WHETHER NATIVE OR TRANSPLANTED HEART: ICD-10-CM

## 2025-05-07 RX ORDER — HYDROCHLOROTHIAZIDE 25 MG/1
25 TABLET ORAL DAILY
Qty: 90 TABLET | Refills: 3 | Status: SHIPPED | OUTPATIENT
Start: 2025-05-07

## 2025-05-09 ENCOUNTER — OFFICE VISIT (OUTPATIENT)
Dept: NEUROLOGY | Facility: CLINIC | Age: 85
End: 2025-05-09
Payer: MEDICARE

## 2025-05-09 VITALS — DIASTOLIC BLOOD PRESSURE: 63 MMHG | SYSTOLIC BLOOD PRESSURE: 127 MMHG | HEART RATE: 63 BPM

## 2025-05-09 DIAGNOSIS — Z86.73 HISTORY OF ISCHEMIC STROKE: Primary | ICD-10-CM

## 2025-05-09 DIAGNOSIS — I25.10 CORONARY ARTERY DISEASE, UNSPECIFIED VESSEL OR LESION TYPE, UNSPECIFIED WHETHER ANGINA PRESENT, UNSPECIFIED WHETHER NATIVE OR TRANSPLANTED HEART: ICD-10-CM

## 2025-05-09 LAB — BODY SURFACE AREA: 1.5 M2

## 2025-05-09 PROCEDURE — 1159F MED LIST DOCD IN RCRD: CPT | Performed by: PSYCHIATRY & NEUROLOGY

## 2025-05-09 PROCEDURE — 3078F DIAST BP <80 MM HG: CPT | Performed by: PSYCHIATRY & NEUROLOGY

## 2025-05-09 PROCEDURE — 1111F DSCHRG MED/CURRENT MED MERGE: CPT | Performed by: PSYCHIATRY & NEUROLOGY

## 2025-05-09 PROCEDURE — 1160F RVW MEDS BY RX/DR IN RCRD: CPT | Performed by: PSYCHIATRY & NEUROLOGY

## 2025-05-09 PROCEDURE — 99214 OFFICE O/P EST MOD 30 MIN: CPT | Performed by: PSYCHIATRY & NEUROLOGY

## 2025-05-09 PROCEDURE — 3074F SYST BP LT 130 MM HG: CPT | Performed by: PSYCHIATRY & NEUROLOGY

## 2025-05-09 PROCEDURE — 93248 EXT ECG>7D<15D REV&INTERPJ: CPT | Performed by: INTERNAL MEDICINE

## 2025-05-09 PROCEDURE — 1036F TOBACCO NON-USER: CPT | Performed by: PSYCHIATRY & NEUROLOGY

## 2025-05-09 PROCEDURE — 99214 OFFICE O/P EST MOD 30 MIN: CPT | Mod: GC | Performed by: PSYCHIATRY & NEUROLOGY

## 2025-05-09 ASSESSMENT — ACTIVITIES OF DAILY LIVING (ADL)
FEEDING: INDEPENDENT
STAIRS: INDEPENDENT
DRESSING: INDEPENDENT (INCLUDING BUTTONS, ZIPS, LACES,ETC.)
TOILET_USE: INDEPENDENT (ON AND OFF, DRESSING, WIPING)
BLADDER: CONTINENT
BOWELS: INDEPENDENT (INCLUDING BUTTONS, ZIPS, LACES, ETC.)
BED_TO_CHAIR_AND_BACK: INDEPENDENT
GROOMING: INDEPENDENT FACE/HAIR/TEETH.SHAVING (IMPLEMENTS PROVIDED)
MOBILITY_LEVEL_SURFACES: INDEPENDENT (BUT MAY USE ANY AID FOR EXAMPLE, STICK) > 50 YARDS
BATHING: INDEPENDENT (OR IN SHOWER)
TOTAL_SCORE: 100

## 2025-05-09 NOTE — PROGRESS NOTES
[facility]   Zabrina Syed is a 84 y.o. year old female presenting for follow-up from a recent hospitalization .   Referred by: Stepan Malone APRN-C*  PCP: Corinna Baer MD    Ms Syed is an 85 yo F w PMH of HTN, HLD, AS, CAD s/p PCI 2021, L frontal vs bilateral frontal (vs artifact on R) ESUS in 4/2024 presenting for fu.    4/9/25-Presented with acute onset RUE sensorimotor deficit and dysarthria that had largely resolved on arrival to ER.  Denies any prior or similar events, no associated sx of chest pain, palpitations.  Lives with son/ stroke survivor and wife who encouraged her to come to ER.   CT- microvascular changes, volume loss.  MRI- DWI- small acute infarct R frontal gyrus with a few dots on the L.  Microvascular changes confirmed.   Labs mildly elevated troponin.   TTE in February- EF 45% with LVH, severely dilated LA  Vascular risk factors- HTN, HPL- LDL 75 (down from >130s), CAD.  No DM     5/9/25- fu in clinic today, no recurrent sx or other neuro deficits. Still on DAPT. Holter negative for afib/flutter, carotid US with R ICA >70% stenosis.    Extensive review of notes in EMR, labs, tests, Interpretation of neuroimaging    CT brain attack head wo IV contrast  Result Date: 4/9/2025  1. No acute intracranial hemorrhage or mass effect. 2. White matter hypoattenuation, ossific, however likely sequelae of chronic microvascular ischemic change.   MACRO: Filemon Renee discussed the significance and urgency of this critical finding by telephone with  STEPAN GOODWIN on 4/9/2025 at 9:10 pm.  (**-RCF-**) Findings:  See findings.   Signed by: Filemon Renee 4/9/2025 9:11 PM Dictation workstation:   [id number]    MR brain wo IV contrast  Result Date: 4/10/2025  1. Scattered small foci of suspected acute to early subacute ischemic injury involving bilateral MCA distributions raising suspicion for an embolic process. 2. Nonspecific white matter  changes most likely represent small-vessel ischemic disease in a patient of this age and also involve the mark. 3. Diffuse parenchymal volume loss.     MACRO: Critical Finding:  Acute infarct. Notification was initiated on 4/10/2025 at 8:26 am by  Alix Betancourt.  (**-OCF-**)   Signed by: Alix Betancourt 4/10/2025 8:26 AM Dictation workstation:   [id number]    Encounter Date: 04/09/25   Electrocardiogram, 12-lead PRN ACS symptoms   Result Value    Ventricular Rate 69    Atrial Rate 69    RI Interval 178    QRS Duration 122    QT Interval 450    QTC Calculation(Bazett) 482    P Axis 76    R Axis -32    T Axis 122    QRS Count 11    Q Onset 218    P Onset 129    P Offset 168    T Offset 443    QTC Fredericia 471     No echocardiogram results found for the past 12 months     Lab Results   Component Value Date    CHOL 138 04/09/2025    CHOL 164 03/17/2025    TRIG 49 04/09/2025    TRIG 74 03/17/2025    HDL 53.4 04/09/2025    HDL 56 03/17/2025    CHHDL 2.6 04/09/2025    CHHDL 2.9 03/17/2025    LDLF 86 05/22/2023    VLDL 10 04/09/2025    NHDL 85 04/09/2025    NHDL 108 03/17/2025     Lab Results   Component Value Date     (H) 04/09/2025    HGBA1C 5.0 04/09/2025    HGBA1C 5.4 03/17/2025     Lab Results   Component Value Date    GLUCOSE 85 04/10/2025    GLUCOSE 87 03/17/2025     04/10/2025     03/17/2025    K 4.0 04/10/2025    K 4.1 03/17/2025     (H) 04/10/2025     03/17/2025    CO2 28 04/10/2025    CO2 22 03/17/2025    ANIONGAP 8 (L) 04/10/2025    ANIONGAP 12 03/17/2025    BUN 18 04/10/2025    BUN 23 03/17/2025    CREATININE 0.83 04/10/2025    CREATININE 0.89 03/17/2025    CALCIUM 9.2 04/10/2025    CALCIUM 9.7 03/17/2025    PHOS 4.2 02/18/2019    ALBUMIN 3.5 04/09/2025    ALBUMIN 3.8 03/17/2025     Lab Results   Component Value Date    CALCIUM 9.2 04/10/2025    CALCIUM 9.7 03/17/2025    PHOS 4.2 02/18/2019    PROT 6.6 04/09/2025    PROT 7.1 03/17/2025    ALBUMIN 3.5 04/09/2025    ALBUMIN  3.8 03/17/2025    AST 37 04/09/2025    AST 29 03/17/2025    ALT 25 04/09/2025    ALT 18 03/17/2025    ALKPHOS 85 04/09/2025    ALKPHOS 95 03/17/2025    BILITOT 0.4 04/09/2025    BILITOT 0.3 03/17/2025     Lab Results   Component Value Date    WBC 5.0 04/10/2025    WBC 5.1 03/17/2025    HGB 10.2 (L) 04/10/2025    HGB 12.2 03/17/2025    HCT 30.3 (L) 04/10/2025    HCT 37.9 03/17/2025    MCV 78 (L) 04/10/2025    MCV 82.9 03/17/2025     04/10/2025     03/17/2025     INR   Date Value Ref Range Status   04/09/2025 1.2 (H) 0.9 - 1.1 Final     Lab Results   Component Value Date    TSH 2.66 04/10/2025       Relevant ROS, Problem list, Past Medical/ Surgical/ Family/ Social history- reviewed and pertinent details noted in history.     Objective     Visit Vitals  /63   Pulse 63   OB Status Postmenopausal   Smoking Status Never       GENERAL:  No distress, alert, interactive and cooperative.     NEURO EXAM:  MENTAL STATE:   Alert and oriented to self, date, place. Recent and remote memory was intact.  Attention span and concentration were normal. Language testing was normal for comprehension, repetition, expression, and naming.  General fund of knowledge was intact.     CRANIAL NERVES:   CN 2   Visual fields full to confrontation.   CN 3, 4, 6    Pupils round, 3 mm in diameter, equally reactive to light. Lids symmetric; no ptosis. EOMs normal alignment, full range with normal saccades, pursuit.   No nystagmus.   CN 5   Facial sensation intact bilaterally.   CN 7   Slight facial asymmetry d/t missing teeth on the right side  CN 8   Hearing intact to conversation  CN 9   Palate elevates symmetrically.   CN 11   Normal strength of shoulder shrug and neck turning.   CN 12   Tongue midline, with normal bulk and strength; no fasciculations.     MOTOR:   Muscle bulk: Normal throughout.  Muscle tone: Normal in both upper and lower extremities.  Movements: No fasciculations, tremors or other abnormal  movement.    Deltoid: R5 L5  Biceps: R5 L5    Triceps: R5 L5  : R5 L5    Hip Flex: R5 L5  Knee Flex: R5 L5  Knee Ext: R5 L5  DorsiFlex: R5 L5  PlantarFlex: R5 L5    REFLEXES:   Biceps: R2L2  Triceps: R2L2  Brachioradialis: R2L2  Patellar: R0L0 * b/l TKR  Achilles: R2L2    SENSORY:  LT and pinprick intact in all extremities     COORDINATION: Finger-Nose-Finger: intact b/l, Heel to [place]: intact b/l    GAIT - stooped, slow, no shuffling/circumduction/ataxia    Neuro Scores/ Scales:   Modified [place] (mRS) Modified [place] Score: No symptoms.   Barthel Index  Feeding: independent  Bathing: independent (or in shower)  Grooming: independent face/hair/teeth.shaving (implements provided)  Dressing : independent (including buttons, zips, laces,etc.)  Bowels: independent (including buttons, zips, laces, etc.)  Bladder: continent  Toilet Use : independent (on and off, dressing, wiping)  Transfers (Bed to chair and back) : independent  Mobility (On level surfaces): independent (but may use any aid for example, stick) > 50 yards  Stairs: independent  Total (0-100): 100   NIHSS: NIH Stroke Scale 1A. Level of Consciousness: Alert, Keenly Responsive, 1B. Ask Month and Age: Both Questions Right, 1C. Blink Eyes & Squeeze Hands: Performs Both Tasks, 2. Best Gaze: Normal, 3. Visual: No Visual Loss, 4. Facial Palsy: Normal Symmetrical Movements, 5A. Motor - Left Arm: No Drift, 5B. Motor - Right Arm: No Drift, 6A. Motor - Left Leg: No Drift, 6B. Motor - Right Leg: No Drift, 7. Limb Ataxia: Absent, 8. Sensory Loss: Normal, 9. Best Language: No Aphasia, 10. Dysarthria: Normal, 11. Extinction and Inattention: No Abnormality, NIH Stroke Scale: 0     Assessment/Plan   1. Coronary artery disease, unspecified vessel or lesion type, unspecified whether angina present, unspecified whether native or transplanted heart      Ms Syed is an 83 yo F w PMH of HTN, HLD, AS, CAD s/p PCI 2021, L frontal vs bilateral frontal (vs artifact on R) ESUS in  "4/2024 presenting for fu. Darling revealed dilated left atrium, no afib on holter. Remains on DAPT. R ICA >70% stenosis is asymptomatic in this setting. Would continue same management and pursue implantable loop recorder.     PLAN   Will contact cardiology for implantable loop recorder  C/w aspirin, plavix, lipitor, zetia  Fu as needed    Plan discussed with the staffing attending physician.    Daniel Garcia  PGY-3 Neurology Resident    Goals for STROKE PREVENTION- recommendations to reduce the risk of vascular events and promote brain health include monitoring and management of vascular risk factors and lifestyle choices to goal values.     Cardiovascular disease- Goal is monitoring and management of conditions that increase stroke risk.   Antithrombotic \"blood thinner\" medication- Antithrombic Therapy/Blood Thinners : Recommended to prevent a stroke or other vascular event  Blood pressure- Normal BP is <120/80 mmHg.  Blood Pressure : Goal is less than 130/80 mmHg  Cholesterol- Ideal lipid profile is an LDL-cholesterol <70 mg/dl, total cholesterol <200 mg/dl, fasting triglycerides < 150 mg/dl and HDL-cholesterol >55 mg/dl.   Blood sugar- Blood Sugar : Goal is fasting sugar  mg/dl, after eating less than 180 mg/dl; HbA1c less than 7%  Healthy physical activity- Goal is a moderate level of exercise at least 30 minutes/day, most days of the week.   Healthy weight- Goal is an ideal weight with a waistline <40\" for men or <35\" for women, and BMI of 18.5-25.   Healthy diet- is rich in vegetables, fruits, whole grains, legumes and fish, low in salt, and avoids red meats and processed/ refined foods.   Healthy sleep- is restorative, ~7 hours/night, with identification and treatment of obstructive/ central sleep apnea that increases the risk of stroke and heart disease.   Smoking- Goal is to stop smoking any tobacco product and avoid second-hand smoke. For help to quit all forms of tobacco, call 1-487-QUIT-NOW " (1-513.663.5682) to speak with a specialist at the [place] [facility].    Alcohol- Goal is moderation; no more than 2 servings for men and 1 serving for non-pregnant women.   Drug use- Goal is avoidance of illicit drugs that can cause blood pressure spikes and damage to blood vessels.   Stroke Warning Signs- know the symptoms of stroke and the importance of calling 911/EMS to access the quickest treatment.     No orders of the defined types were placed in this encounter.

## 2025-05-28 ENCOUNTER — APPOINTMENT (OUTPATIENT)
Dept: CARDIOLOGY | Facility: HOSPITAL | Age: 85
End: 2025-05-28
Payer: MEDICARE

## 2025-06-06 ENCOUNTER — APPOINTMENT (OUTPATIENT)
Dept: NEUROLOGY | Facility: CLINIC | Age: 85
End: 2025-06-06
Payer: MEDICARE

## 2025-06-17 PROBLEM — I50.31 ACUTE DIASTOLIC HEART FAILURE: Status: ACTIVE | Noted: 2025-06-17

## 2025-06-17 PROBLEM — L91.8 SKIN TAG: Status: ACTIVE | Noted: 2025-06-17

## 2025-06-17 PROBLEM — E86.0 DEHYDRATION: Status: ACTIVE | Noted: 2025-06-17

## 2025-06-17 PROBLEM — R07.0 PAIN IN THROAT: Status: ACTIVE | Noted: 2025-06-17

## 2025-06-17 PROBLEM — M11.20 CALCIUM PYROPHOSPHATE DEPOSITION DISEASE (CPPD): Status: ACTIVE | Noted: 2025-06-17

## 2025-06-23 ENCOUNTER — OFFICE VISIT (OUTPATIENT)
Dept: CARDIOLOGY | Facility: CLINIC | Age: 85
End: 2025-06-23
Payer: MEDICARE

## 2025-06-23 ENCOUNTER — APPOINTMENT (OUTPATIENT)
Dept: CARDIOLOGY | Facility: CLINIC | Age: 85
End: 2025-06-23
Payer: MEDICARE

## 2025-06-23 VITALS
BODY MASS INDEX: 21.22 KG/M2 | HEART RATE: 65 BPM | WEIGHT: 116 LBS | SYSTOLIC BLOOD PRESSURE: 114 MMHG | OXYGEN SATURATION: 98 % | DIASTOLIC BLOOD PRESSURE: 61 MMHG

## 2025-06-23 DIAGNOSIS — I35.0 NONRHEUMATIC AORTIC VALVE STENOSIS: Primary | ICD-10-CM

## 2025-06-23 DIAGNOSIS — I10 PRIMARY HYPERTENSION: ICD-10-CM

## 2025-06-23 DIAGNOSIS — I25.10 CORONARY ARTERY DISEASE INVOLVING NATIVE CORONARY ARTERY OF NATIVE HEART WITHOUT ANGINA PECTORIS: ICD-10-CM

## 2025-06-23 DIAGNOSIS — E78.5 HYPERLIPIDEMIA, UNSPECIFIED HYPERLIPIDEMIA TYPE: ICD-10-CM

## 2025-06-23 PROCEDURE — 3078F DIAST BP <80 MM HG: CPT | Performed by: NURSE PRACTITIONER

## 2025-06-23 PROCEDURE — 99214 OFFICE O/P EST MOD 30 MIN: CPT | Performed by: NURSE PRACTITIONER

## 2025-06-23 PROCEDURE — 1036F TOBACCO NON-USER: CPT | Performed by: NURSE PRACTITIONER

## 2025-06-23 PROCEDURE — 1159F MED LIST DOCD IN RCRD: CPT | Performed by: NURSE PRACTITIONER

## 2025-06-23 PROCEDURE — 3074F SYST BP LT 130 MM HG: CPT | Performed by: NURSE PRACTITIONER

## 2025-06-23 PROCEDURE — 99212 OFFICE O/P EST SF 10 MIN: CPT

## 2025-06-23 PROCEDURE — 1160F RVW MEDS BY RX/DR IN RCRD: CPT | Performed by: NURSE PRACTITIONER

## 2025-06-23 PROCEDURE — 1126F AMNT PAIN NOTED NONE PRSNT: CPT | Performed by: NURSE PRACTITIONER

## 2025-06-23 RX ORDER — CLOPIDOGREL BISULFATE 75 MG/1
75 TABLET ORAL DAILY
COMMUNITY

## 2025-06-23 ASSESSMENT — PATIENT HEALTH QUESTIONNAIRE - PHQ9
1. LITTLE INTEREST OR PLEASURE IN DOING THINGS: NOT AT ALL
SUM OF ALL RESPONSES TO PHQ9 QUESTIONS 1 AND 2: 0
2. FEELING DOWN, DEPRESSED OR HOPELESS: NOT AT ALL

## 2025-06-23 ASSESSMENT — PAIN SCALES - GENERAL: PAINLEVEL_OUTOF10: 0-NO PAIN

## 2025-06-23 NOTE — PROGRESS NOTES
Subjective   Zabrina Syed is a 84 y.o. female.    Chief Complaint:  Follow-up, Hypertension, Hyperlipidemia, Coronary Artery Disease, and Valve Disorder    Mrs. Syed returns for a routine 2 month follow up. She is seen in collaboration with Dr. Onofre. She has been feeling well from a cardiac standpoint. She has remained compliant with her medications, denying any intolerances. She remains somewhat frail, though seems to be getting around reasonably well with the use of a rollator. She was recently seen by neurology who recommended a loop recorder (recent CVA suspicious for an embolic process) though she is not interested in this. She denies any recent ER visits or hospitalizations. She offers no specific cardiovascular complaints or concerns today. She denies any complaints of chest pain, shortness of breath, lightheadedness, dizziness, palpitations, syncope, orthopnea, paroxysmal nocturnal dyspnea, lower extremity swelling or bleeding concerns.          Review of Systems   All other systems reviewed and are negative.      Objective   Physical Exam  Constitutional:       Appearance: Healthy appearance. In no distress  Pulmonary:      Effort: Pulmonary effort is normal.      Breath sounds: Normal breath sounds.   Cardiovascular:      Normal rate. Regular rhythm. Normal S1. Normal S2.       Murmurs: There is 2/6 systolic murmur.      Carotids: right carotid pulse +2, no bruit heard over the right carotid. left carotid pulse +2, no bruit heard over the left carotid.  Edema:     Peripheral edema absent.   Abdominal:      Palpations: Abdomen is soft.   Musculoskeletal:       Cervical back: Normal range of motion.   Skin:     General: Skin is warm and dry. Normal color and pigmentation   Neurological:      Mental Status: Alert and oriented to person, place and time.   Psychiatric:     Mood and Affect: appropriate mood and appropriate affect.       Current Outpatient Medications:     aspirin 81 mg chewable  tablet, Chew 1 tablet (81 mg) once daily., Disp: , Rfl:     atorvastatin (Lipitor) 80 mg tablet, TAKE 1 TABLET BY MOUTH AT BEDTIME, Disp: 90 tablet, Rfl: 1    clopidogrel (Plavix) 75 mg tablet, Take 1 tablet (75 mg) by mouth once daily., Disp: , Rfl:     cycloSPORINE (Restasis) 0.05 % ophthalmic emulsion, Administer 1 drop into both eyes 2 times a day., Disp: 180 mL, Rfl: 1    ezetimibe (Zetia) 10 mg tablet, TAKE 1 TABLET(10 MG) BY MOUTH DAILY AT BEDTIME, Disp: 90 tablet, Rfl: 2    hydroCHLOROthiazide (HYDRODiuril) 25 mg tablet, Take 1 tablet (25 mg) by mouth once daily., Disp: 90 tablet, Rfl: 3    metoprolol tartrate (Lopressor) 25 mg tablet, TAKE 1/2 TABLET BY MOUTH TWICE DAILY, Disp: 90 tablet, Rfl: 1    walker misc, Use as Directed., Disp: , Rfl:         Lab Review:   Lab Results   Component Value Date     04/10/2025     03/17/2025    K 4.0 04/10/2025    K 4.1 03/17/2025     (H) 04/10/2025     03/17/2025    CO2 28 04/10/2025    CO2 22 03/17/2025    BUN 18 04/10/2025    BUN 23 03/17/2025    CREATININE 0.83 04/10/2025    CREATININE 0.89 03/17/2025    GLUCOSE 85 04/10/2025    GLUCOSE 87 03/17/2025    CALCIUM 9.2 04/10/2025    CALCIUM 9.7 03/17/2025     Lab Results   Component Value Date    WBC 5.0 04/10/2025    WBC 5.1 03/17/2025    HGB 10.2 (L) 04/10/2025    HGB 12.2 03/17/2025    HCT 30.3 (L) 04/10/2025    HCT 37.9 03/17/2025    MCV 78 (L) 04/10/2025    MCV 82.9 03/17/2025     04/10/2025     03/17/2025     Lab Results   Component Value Date    CHOL 138 04/09/2025    CHOL 164 03/17/2025    TRIG 49 04/09/2025    TRIG 74 03/17/2025    HDL 53.4 04/09/2025    HDL 56 03/17/2025       Assessment/Plan   Mrs. Syed is a pleasant 84-year-old  female with a past medical history significant for hypertension, hyperlipidemia, moderate aortic stenosis, CAD/NSTEMI s/p PCI to Lcx 11/22/2021 and CVA 4/2025. Heart catheterization 4/2024 showed moderate aortic stenosis, a  widely patent proximal-mid Lcx stent and mildly reduced LVEF (45%). Holter monitor 4/2025 showed sinus rhythm with an average HR of 61 bpm. There was <1% VE and SVE and no evidence of atrial fibrillation. Echocardiogram 4/2025 showed an EF of 44%, moderate-severe pulmonary HTN and moderate aortic stenosis/AI. She presents today for routine follow up stable from a cardiac standpoint. Her VS remain stable. She seems to be getting around reasonably well with the use of a rollator. I will have her continue all medications unchanged. She has been reluctant to pursue any invasive procedures or TAVR for her aortic stenosis, and is not interested in a loop recorder either. She will follow up with us in clinic in 2 months. She knows to call for any concerns.

## 2025-07-09 ENCOUNTER — APPOINTMENT (OUTPATIENT)
Dept: CARDIOLOGY | Facility: HOSPITAL | Age: 85
End: 2025-07-09
Payer: MEDICARE

## 2025-07-09 ENCOUNTER — OFFICE VISIT (OUTPATIENT)
Dept: PRIMARY CARE | Facility: CLINIC | Age: 85
End: 2025-07-09
Payer: MEDICARE

## 2025-07-09 VITALS
HEART RATE: 81 BPM | TEMPERATURE: 97.3 F | RESPIRATION RATE: 16 BRPM | DIASTOLIC BLOOD PRESSURE: 65 MMHG | HEIGHT: 62 IN | BODY MASS INDEX: 20.85 KG/M2 | SYSTOLIC BLOOD PRESSURE: 117 MMHG | WEIGHT: 113.3 LBS | OXYGEN SATURATION: 97 %

## 2025-07-09 DIAGNOSIS — E78.5 HYPERLIPIDEMIA, UNSPECIFIED HYPERLIPIDEMIA TYPE: ICD-10-CM

## 2025-07-09 DIAGNOSIS — I10 PRIMARY HYPERTENSION: ICD-10-CM

## 2025-07-09 DIAGNOSIS — I25.10 CORONARY ARTERY DISEASE INVOLVING NATIVE CORONARY ARTERY OF NATIVE HEART WITHOUT ANGINA PECTORIS: ICD-10-CM

## 2025-07-09 DIAGNOSIS — I35.0 NONRHEUMATIC AORTIC VALVE STENOSIS: Primary | ICD-10-CM

## 2025-07-09 DIAGNOSIS — Z86.73 HISTORY OF ISCHEMIC STROKE: ICD-10-CM

## 2025-07-09 PROCEDURE — 3078F DIAST BP <80 MM HG: CPT | Performed by: INTERNAL MEDICINE

## 2025-07-09 PROCEDURE — 1036F TOBACCO NON-USER: CPT | Performed by: INTERNAL MEDICINE

## 2025-07-09 PROCEDURE — 3074F SYST BP LT 130 MM HG: CPT | Performed by: INTERNAL MEDICINE

## 2025-07-09 PROCEDURE — 99214 OFFICE O/P EST MOD 30 MIN: CPT | Performed by: INTERNAL MEDICINE

## 2025-07-09 PROCEDURE — 99214 OFFICE O/P EST MOD 30 MIN: CPT | Mod: GC | Performed by: INTERNAL MEDICINE

## 2025-07-09 PROCEDURE — 1126F AMNT PAIN NOTED NONE PRSNT: CPT | Performed by: INTERNAL MEDICINE

## 2025-07-09 SDOH — ECONOMIC STABILITY: FOOD INSECURITY: WITHIN THE PAST 12 MONTHS, YOU WORRIED THAT YOUR FOOD WOULD RUN OUT BEFORE YOU GOT MONEY TO BUY MORE.: NEVER TRUE

## 2025-07-09 SDOH — ECONOMIC STABILITY: FOOD INSECURITY: WITHIN THE PAST 12 MONTHS, THE FOOD YOU BOUGHT JUST DIDN'T LAST AND YOU DIDN'T HAVE MONEY TO GET MORE.: NEVER TRUE

## 2025-07-09 ASSESSMENT — ENCOUNTER SYMPTOMS
OCCASIONAL FEELINGS OF UNSTEADINESS: 0
LOSS OF SENSATION IN FEET: 0
DEPRESSION: 0

## 2025-07-09 ASSESSMENT — PAIN SCALES - GENERAL: PAINLEVEL_OUTOF10: 0-NO PAIN

## 2025-07-09 ASSESSMENT — PATIENT HEALTH QUESTIONNAIRE - PHQ9
2. FEELING DOWN, DEPRESSED OR HOPELESS: NOT AT ALL
SUM OF ALL RESPONSES TO PHQ9 QUESTIONS 1 AND 2: 0
1. LITTLE INTEREST OR PLEASURE IN DOING THINGS: NOT AT ALL

## 2025-07-09 ASSESSMENT — LIFESTYLE VARIABLES: HOW MANY STANDARD DRINKS CONTAINING ALCOHOL DO YOU HAVE ON A TYPICAL DAY: PATIENT DOES NOT DRINK

## 2025-07-09 NOTE — PROGRESS NOTES
SUBJECTIVE  Zabrina Syed is a 85 y.o. female with a h/o HTN, HLD, AS, CAD s/p PCI 2021, ischemic stroke presenting for fuv prior to travel.      Going to trip on July 26 to [place] for a week. Patient denies cp, palpitations, dyspnea, lightheadedness, dizziness, paresthesias, weakness, falls. Reports she was seen by neuro and cardiology and was told she was ok to travel.      OBJECTIVE    Vitals  Visit Vitals  /65   Pulse 81   Temp 36.3 °C (97.3 °F) (Temporal)   Resp 16         Physical Exam  General: Awake, alert  HEENT: Normocephalic, atraumatic.   Cardiac: Normal RR. S1&S2. Systolic murmur. no JVD. Radial pulses 2+  Respiratory: Lungs CTAB. No wheezes, rhonchi, rales, normal work of breathing on RA  Abdomen: +BS. Soft, non tender, non distended  MSK/Extremities: No peripheral edema b/l, no asymmetry noted  Neuro: Aox4. No gross defecits. Moving all limbs spontaneously, follows commands  Psych: Appropriate mood and behavior. Coherent thought process,      Medications  Current Medications[1]    Health Maintenance   Topic Date Due    Bone Density Scan  Never done    DTaP/Tdap/Td Vaccines (1 - Tdap) Never done    Zoster Vaccines (1 of 2) Never done    RSV High Risk: (Elderly (60+) or Pregnant Population) (1 - 1-dose 75+ series) Never done    COVID-19 Vaccine (2 - Yanna risk series) 04/04/2021    Influenza Vaccine (1) 09/01/2025    Creatinine Level  04/10/2026    Potassium Level  04/10/2026    Echocardiogram  04/10/2026    Diabetes Screening  04/10/2026    Medicare Annual Wellness Visit (AWV)  04/19/2026    Lipid Panel  04/09/2030    HPV Vaccines (No Doses Required) Completed    Pneumococcal Vaccine  Completed    HIB Vaccines  Aged Out    Hepatitis B Vaccines  Aged Out    IPV Vaccines  Aged Out    Hepatitis A Vaccines  Aged Out    Meningococcal Vaccine  Aged Out    Rotavirus Vaccines  Aged Out    Welcome to Medicare Visit  Discontinued         ASSESSMENT/PLAN  Zabrina Syed is a 85 y.o. yo female with a h/o  HTN, HLD, AS, CAD s/p PCI 2021, ischemic stroke presenting for fuv prior to travel. Patient ok to travel.    Assessment & Plan  Nonrheumatic aortic valve stenosis       - Follow up with cardiology  Hyperlipidemia, unspecified hyperlipidemia type       - c/w atorvastatin 80mg every day, zetia 10mg qd  Primary hypertension       -c/w hydrochlorothiazide 25mg qd  History of ischemic stroke       - c/w ASA 81, plavix  Coronary artery disease involving native coronary artery of native heart without angina pectoris       -c/w metoprolol tartrate 25mg bid, statin and zetia, ASA           RTC in Nov for CPE      Patient seen and staffed with attending physician, Dr. Baer.  Ligia Lorenzana MD  Internal Medicine, PGY-3            [1]   Current Outpatient Medications:     aspirin 81 mg chewable tablet, Chew 1 tablet (81 mg) once daily., Disp: , Rfl:     atorvastatin (Lipitor) 80 mg tablet, TAKE 1 TABLET BY MOUTH AT BEDTIME, Disp: 90 tablet, Rfl: 1    clopidogrel (Plavix) 75 mg tablet, Take 1 tablet (75 mg) by mouth once daily., Disp: , Rfl:     cycloSPORINE (Restasis) 0.05 % ophthalmic emulsion, Administer 1 drop into both eyes 2 times a day., Disp: 180 mL, Rfl: 1    ezetimibe (Zetia) 10 mg tablet, TAKE 1 TABLET(10 MG) BY MOUTH DAILY AT BEDTIME, Disp: 90 tablet, Rfl: 2    hydroCHLOROthiazide (HYDRODiuril) 25 mg tablet, Take 1 tablet (25 mg) by mouth once daily., Disp: 90 tablet, Rfl: 3    metoprolol tartrate (Lopressor) 25 mg tablet, TAKE 1/2 TABLET BY MOUTH TWICE DAILY, Disp: 90 tablet, Rfl: 1    walker misc, Use as Directed., Disp: , Rfl:

## 2025-08-18 ENCOUNTER — APPOINTMENT (OUTPATIENT)
Dept: CARDIOLOGY | Facility: CLINIC | Age: 85
End: 2025-08-18
Payer: MEDICARE

## 2025-08-19 ENCOUNTER — APPOINTMENT (OUTPATIENT)
Dept: CARDIOLOGY | Facility: HOSPITAL | Age: 85
End: 2025-08-19
Payer: MEDICARE

## 2025-08-26 ENCOUNTER — APPOINTMENT (OUTPATIENT)
Dept: CARDIOLOGY | Facility: HOSPITAL | Age: 85
End: 2025-08-26
Payer: MEDICARE

## 2025-09-05 DIAGNOSIS — I25.10 CORONARY ARTERY DISEASE INVOLVING NATIVE HEART, UNSPECIFIED VESSEL OR LESION TYPE, UNSPECIFIED WHETHER ANGINA PRESENT: ICD-10-CM

## 2025-09-05 DIAGNOSIS — H40.1221 NORMAL TENSION GLAUCOMA OF LEFT EYE, MILD STAGE: ICD-10-CM

## 2025-09-05 DIAGNOSIS — H40.1212 NORMAL TENSION GLAUCOMA OF RIGHT EYE, MODERATE STAGE: ICD-10-CM

## 2025-09-05 RX ORDER — BRIMONIDINE TARTRATE 1.5 MG/ML
1 SOLUTION/ DROPS OPHTHALMIC 2 TIMES DAILY
Qty: 15 ML | Refills: 3 | Status: SHIPPED | OUTPATIENT
Start: 2025-09-05

## 2025-09-05 RX ORDER — METOPROLOL TARTRATE 25 MG/1
12.5 TABLET, FILM COATED ORAL 2 TIMES DAILY
Qty: 90 TABLET | Refills: 1 | Status: SHIPPED | OUTPATIENT
Start: 2025-09-05

## 2025-09-10 ENCOUNTER — APPOINTMENT (OUTPATIENT)
Dept: OPHTHALMOLOGY | Facility: CLINIC | Age: 85
End: 2025-09-10
Payer: MEDICARE

## 2025-11-17 ENCOUNTER — APPOINTMENT (OUTPATIENT)
Dept: NEUROLOGY | Facility: CLINIC | Age: 85
End: 2025-11-17
Payer: MEDICARE

## (undated) DEVICE — GUIDEWIRE, INQWIRE, 3MM J, .035, 150

## (undated) DEVICE — GUIDEWIRE, INQWIRE, 0.025 X 150CM, FIXED CORE, 3MM J-TIP

## (undated) DEVICE — CATHETER, PIGTAIL 155 MOD DIAGNOSTIC, 4 FR (110 CM)

## (undated) DEVICE — CATHETER, DIAGNOSTIC, 4 FR-JR 4

## (undated) DEVICE — CATHETER, DIAGNOSTIC, 4 FR-JL 4.5

## (undated) DEVICE — CATHETER, ANGIO, IMPULSE, FL4.5, 5 FR X 100 CM

## (undated) DEVICE — SHEATH, PINNACLE, 10 CM,  4FR INTRODUCER, 4FR DIA, 2.5 CM DIALATOR

## (undated) DEVICE — SHEATH, PINNACLE, 10 CM,  6FR INTRODUCER, 6FR DIA, 2.5 CM DIALATOR

## (undated) DEVICE — SHEATH, PINNACLE, 10 CM,  7FR INTRODUCER, 7FR DIA, 2.5 CM DIALATOR

## (undated) DEVICE — CATHETER, WEDGE PRESSURE, BALLOON, DOUBLE LUMEN, 6 FR, 110 CM

## (undated) DEVICE — INTRODUCER, GLIDESHEATH SLENDER A-KIT, 5FR 10CM

## (undated) DEVICE — CATHETER, WEDGE PRESSURE, BALLOON, DOUBLE LUMEN, 5 FR, 110 CM

## (undated) DEVICE — CATHETER, WEDGE PRESSURE, PULMONARY, 7 FR, 110 CM, GUIDEWIRE, 0.038 IN

## (undated) DEVICE — INTRODUCER, MICRO, 4FR, 7CM ECHO

## (undated) DEVICE — SHEATH, PINNACLE, 10 CM,  5FR INTRODUCER, 5FR DIA, 2.5 CM DIALATOR